# Patient Record
Sex: MALE | Race: WHITE | Employment: UNEMPLOYED | URBAN - METROPOLITAN AREA
[De-identification: names, ages, dates, MRNs, and addresses within clinical notes are randomized per-mention and may not be internally consistent; named-entity substitution may affect disease eponyms.]

---

## 2017-09-08 ENCOUNTER — HOSPITAL ENCOUNTER (OUTPATIENT)
Dept: MRI IMAGING | Age: 42
Discharge: HOME | End: 2017-09-08
Attending: PHYSICIAN ASSISTANT | Admitting: PHYSICIAN ASSISTANT

## 2017-09-08 DIAGNOSIS — M25.562 CHRONIC PAIN OF LEFT KNEE: ICD-10-CM

## 2017-09-08 DIAGNOSIS — M25.562 PAIN IN LEFT KNEE: ICD-10-CM

## 2017-09-08 DIAGNOSIS — G89.29 CHRONIC PAIN OF LEFT KNEE: ICD-10-CM

## 2018-03-30 PROBLEM — K57.32 DIVERTICULITIS OF COLON: Status: ACTIVE | Noted: 2018-03-30

## 2018-04-09 ENCOUNTER — OFFICE VISIT (OUTPATIENT)
Dept: SURGERY | Age: 43
End: 2018-04-09

## 2018-04-09 VITALS
BODY MASS INDEX: 27.11 KG/M2 | SYSTOLIC BLOOD PRESSURE: 110 MMHG | DIASTOLIC BLOOD PRESSURE: 78 MMHG | HEIGHT: 69 IN | WEIGHT: 183 LBS

## 2018-04-09 DIAGNOSIS — K57.20 DIVERTICULITIS OF LARGE INTESTINE WITH PERFORATION WITHOUT BLEEDING: Primary | ICD-10-CM

## 2018-04-09 PROCEDURE — 1111F DSCHRG MED/CURRENT MED MERGE: CPT | Performed by: SURGERY

## 2018-04-09 PROCEDURE — 99213 OFFICE O/P EST LOW 20 MIN: CPT | Performed by: SURGERY

## 2018-04-09 PROCEDURE — 1036F TOBACCO NON-USER: CPT | Performed by: SURGERY

## 2018-04-09 PROCEDURE — G8419 CALC BMI OUT NRM PARAM NOF/U: HCPCS | Performed by: SURGERY

## 2018-04-09 PROCEDURE — G8427 DOCREV CUR MEDS BY ELIG CLIN: HCPCS | Performed by: SURGERY

## 2018-04-09 RX ORDER — BUTALBITAL, ACETAMINOPHEN AND CAFFEINE 50; 325; 40 MG/1; MG/1; MG/1
TABLET ORAL
COMMUNITY
Start: 2018-03-09 | End: 2019-03-29

## 2018-04-13 ENCOUNTER — TELEPHONE (OUTPATIENT)
Dept: SURGERY | Age: 43
End: 2018-04-13

## 2018-04-25 DIAGNOSIS — K57.20 PERFORATED DIVERTICULUM OF LARGE INTESTINE: Primary | ICD-10-CM

## 2018-05-04 LAB
BUN BLDV-MCNC: 15 MG/DL (ref 5–19)
CREAT SERPL-MCNC: 1.1 MG/DL (ref 0.6–1.3)
GFR CALCULATED: 73

## 2018-05-07 ENCOUNTER — OFFICE VISIT (OUTPATIENT)
Dept: SURGERY | Age: 43
End: 2018-05-07

## 2018-05-07 ENCOUNTER — TELEPHONE (OUTPATIENT)
Dept: SURGERY | Age: 43
End: 2018-05-07

## 2018-05-07 VITALS
BODY MASS INDEX: 27.4 KG/M2 | HEIGHT: 69 IN | SYSTOLIC BLOOD PRESSURE: 118 MMHG | DIASTOLIC BLOOD PRESSURE: 70 MMHG | WEIGHT: 185 LBS

## 2018-05-07 DIAGNOSIS — K57.20 PERFORATED DIVERTICULUM OF LARGE INTESTINE: Primary | ICD-10-CM

## 2018-05-07 PROCEDURE — 99213 OFFICE O/P EST LOW 20 MIN: CPT | Performed by: SURGERY

## 2018-05-07 PROCEDURE — G8419 CALC BMI OUT NRM PARAM NOF/U: HCPCS | Performed by: SURGERY

## 2018-05-07 PROCEDURE — 1036F TOBACCO NON-USER: CPT | Performed by: SURGERY

## 2018-05-07 PROCEDURE — G8427 DOCREV CUR MEDS BY ELIG CLIN: HCPCS | Performed by: SURGERY

## 2018-05-08 ENCOUNTER — TELEPHONE (OUTPATIENT)
Dept: GASTROENTEROLOGY | Age: 43
End: 2018-05-08

## 2018-05-08 DIAGNOSIS — K57.20 DIVERTICULITIS OF LARGE INTESTINE WITH PERFORATION, UNSPECIFIED BLEEDING STATUS: Primary | ICD-10-CM

## 2018-05-09 RX ORDER — POLYETHYLENE GLYCOL 3350 17 G/17G
255 POWDER, FOR SOLUTION ORAL ONCE
Qty: 255 G | Refills: 0 | Status: SHIPPED | OUTPATIENT
Start: 2018-05-09 | End: 2018-05-09

## 2018-05-18 ENCOUNTER — TELEPHONE (OUTPATIENT)
Dept: GASTROENTEROLOGY | Age: 43
End: 2018-05-18

## 2018-05-21 ENCOUNTER — HOSPITAL ENCOUNTER (OUTPATIENT)
Dept: OTHER | Age: 43
Discharge: HOME | End: 2018-05-21
Attending: INTERNAL MEDICINE | Admitting: INTERNAL MEDICINE

## 2018-05-21 ENCOUNTER — TELEPHONE (OUTPATIENT)
Dept: GASTROENTEROLOGY | Age: 43
End: 2018-05-21

## 2018-05-21 VITALS
RESPIRATION RATE: 12 BRPM | WEIGHT: 180 LBS | SYSTOLIC BLOOD PRESSURE: 115 MMHG | OXYGEN SATURATION: 96 % | HEIGHT: 69 IN | DIASTOLIC BLOOD PRESSURE: 81 MMHG | BODY MASS INDEX: 26.66 KG/M2 | TEMPERATURE: 97.1 F | HEART RATE: 79 BPM

## 2018-05-21 DIAGNOSIS — K57.30 SIGMOID DIVERTICULOSIS: ICD-10-CM

## 2018-05-21 PROCEDURE — 45378 DIAGNOSTIC COLONOSCOPY: CPT | Performed by: INTERNAL MEDICINE

## 2018-05-21 RX ORDER — SODIUM CHLORIDE 9 MG/ML
INJECTION, SOLUTION INTRAVENOUS CONTINUOUS
Status: DISCONTINUED | OUTPATIENT
Start: 2018-05-21 | End: 2018-05-22 | Stop reason: HOSPADM

## 2018-05-21 RX ADMIN — SODIUM CHLORIDE: 9 INJECTION, SOLUTION INTRAVENOUS at 08:02

## 2018-05-21 ASSESSMENT — PAIN - FUNCTIONAL ASSESSMENT: PAIN_FUNCTIONAL_ASSESSMENT: 0-10

## 2018-05-22 PROBLEM — K57.20 DIVERTICULITIS OF LARGE INTESTINE WITH PERFORATION WITHOUT BLEEDING: Status: ACTIVE | Noted: 2018-03-30

## 2018-05-26 PROBLEM — K57.20 DIVERTICULITIS OF LARGE INTESTINE WITH PERFORATION WITHOUT BLEEDING: Status: RESOLVED | Noted: 2018-03-30 | Resolved: 2018-05-26

## 2018-05-30 ENCOUNTER — OFFICE VISIT (OUTPATIENT)
Dept: SURGERY | Age: 43
End: 2018-05-30

## 2018-05-30 VITALS
BODY MASS INDEX: 25.18 KG/M2 | WEIGHT: 170 LBS | HEIGHT: 69 IN | SYSTOLIC BLOOD PRESSURE: 100 MMHG | DIASTOLIC BLOOD PRESSURE: 70 MMHG

## 2018-05-30 DIAGNOSIS — Z09 SURGERY FOLLOW-UP EXAMINATION: Primary | ICD-10-CM

## 2018-05-30 PROCEDURE — 99024 POSTOP FOLLOW-UP VISIT: CPT | Performed by: SURGERY

## 2018-06-06 ENCOUNTER — OFFICE VISIT (OUTPATIENT)
Dept: SURGERY | Age: 43
End: 2018-06-06

## 2018-06-06 VITALS
HEIGHT: 69 IN | DIASTOLIC BLOOD PRESSURE: 72 MMHG | WEIGHT: 174 LBS | SYSTOLIC BLOOD PRESSURE: 110 MMHG | BODY MASS INDEX: 25.77 KG/M2

## 2018-06-06 DIAGNOSIS — Z09 SURGERY FOLLOW-UP EXAMINATION: Primary | ICD-10-CM

## 2018-06-06 PROCEDURE — 99024 POSTOP FOLLOW-UP VISIT: CPT | Performed by: SURGERY

## 2018-06-20 ENCOUNTER — OFFICE VISIT (OUTPATIENT)
Dept: SURGERY | Age: 43
End: 2018-06-20

## 2018-06-20 VITALS
WEIGHT: 177 LBS | DIASTOLIC BLOOD PRESSURE: 82 MMHG | BODY MASS INDEX: 26.22 KG/M2 | SYSTOLIC BLOOD PRESSURE: 126 MMHG | HEIGHT: 69 IN

## 2018-06-20 DIAGNOSIS — Z09 SURGERY FOLLOW-UP EXAMINATION: Primary | ICD-10-CM

## 2018-06-20 PROCEDURE — 99024 POSTOP FOLLOW-UP VISIT: CPT | Performed by: SURGERY

## 2018-09-17 ENCOUNTER — TELEPHONE (OUTPATIENT)
Dept: SURGERY | Age: 43
End: 2018-09-17

## 2018-09-17 NOTE — TELEPHONE ENCOUNTER
Spoke to patient. He denies fever,severe abd pain or vomiting. BM normal other than some diarrhea today. He will watch his symptoms and go to ER if thingd become severe.   Otherwise  He will call me on Wed and let me know how he is feeling and we can see him if not improved

## 2018-09-17 NOTE — TELEPHONE ENCOUNTER
Pt sts he is in extreme pain on his left side. Doesn't know if it is related to surgery. His pain level is a 5-6. He would like to know what to do.

## 2019-03-29 ENCOUNTER — HOSPITAL ENCOUNTER (EMERGENCY)
Age: 44
Discharge: HOME | End: 2019-03-29
Payer: MEDICAID

## 2019-03-29 VITALS
HEART RATE: 87 BPM | TEMPERATURE: 97.3 F | SYSTOLIC BLOOD PRESSURE: 130 MMHG | HEIGHT: 69 IN | OXYGEN SATURATION: 98 % | BODY MASS INDEX: 29.62 KG/M2 | RESPIRATION RATE: 15 BRPM | DIASTOLIC BLOOD PRESSURE: 88 MMHG | WEIGHT: 200 LBS

## 2019-03-29 DIAGNOSIS — J01.10 ACUTE NON-RECURRENT FRONTAL SINUSITIS: Primary | ICD-10-CM

## 2019-03-29 LAB
RAPID INFLUENZA  B AGN: NEGATIVE
RAPID INFLUENZA A AGN: NEGATIVE

## 2019-03-29 PROCEDURE — 6370000000 HC RX 637 (ALT 250 FOR IP): Performed by: NURSE PRACTITIONER

## 2019-03-29 PROCEDURE — 99283 EMERGENCY DEPT VISIT LOW MDM: CPT

## 2019-03-29 PROCEDURE — 87804 INFLUENZA ASSAY W/OPTIC: CPT

## 2019-03-29 RX ORDER — AMOXICILLIN AND CLAVULANATE POTASSIUM 875; 125 MG/1; MG/1
1 TABLET, FILM COATED ORAL ONCE
Status: COMPLETED | OUTPATIENT
Start: 2019-03-29 | End: 2019-03-29

## 2019-03-29 RX ORDER — NAPROXEN 250 MG/1
250 TABLET ORAL ONCE
Status: COMPLETED | OUTPATIENT
Start: 2019-03-29 | End: 2019-03-29

## 2019-03-29 RX ORDER — AMOXICILLIN AND CLAVULANATE POTASSIUM 875; 125 MG/1; MG/1
1 TABLET, FILM COATED ORAL 2 TIMES DAILY
Qty: 20 TABLET | Refills: 0 | Status: SHIPPED | OUTPATIENT
Start: 2019-03-29 | End: 2019-04-08

## 2019-03-29 RX ADMIN — AMOXICILLIN AND CLAVULANATE POTASSIUM 1 TABLET: 875; 125 TABLET, FILM COATED ORAL at 13:57

## 2019-03-29 RX ADMIN — NAPROXEN 250 MG: 250 TABLET ORAL at 13:57

## 2019-03-29 ASSESSMENT — PAIN SCALES - GENERAL: PAINLEVEL_OUTOF10: 6

## 2019-03-29 ASSESSMENT — ENCOUNTER SYMPTOMS
SHORTNESS OF BREATH: 0
ABDOMINAL PAIN: 0
SINUS PRESSURE: 1

## 2019-03-29 ASSESSMENT — PAIN DESCRIPTION - PAIN TYPE: TYPE: ACUTE PAIN

## 2019-03-29 ASSESSMENT — PAIN DESCRIPTION - LOCATION: LOCATION: HEAD

## 2019-03-29 NOTE — ED PROVIDER NOTES
Evaluated by [id number] [facility] Provider          [facility] ED  eMERGENCY dEPARTMENT eNCOUnter        Pt Name: Sagar Jones  MRN: [id number]  [place] 1975  Dateof evaluation: 3/29/2019  Provider: YFN Morales - CNP  PCP: Karlos Li PA-C  ED Attending: No att. providers found    [address] [place] [address]       Chief Complaint   Patient presents with    Influenza     Pt reports generalized body aching, headache, sneezing, nasal drainage since yesterday. HISTORY OF PRESENTILLNESS   (Location/Symptom, Timing/Onset, Context/Setting, Quality, Duration, Modifying Factors, Severity)  Note limiting factors. Sagar Jones is a 39 y.o. male for congestion. Onset was yesterday. Duration has been since the onset. Context includes patient reports that he has had facial pressure and congestion since yesterday. He also reports that he is recently been exposed to in the members who have been sick as well. Alleviating factors include nothing. Aggravating factors include nothing. Pain is 6/10. Nothing has been used for pain today. Nursing Notes were all reviewed and agreed with or any disagreements were addressed  in the HPI. REVIEW OF SYSTEMS    (2-9 systems for level 4, 10 or more for level 5)     Review of Systems   Constitutional: Positive for chills. Negative for fever. HENT: Positive for congestion and sinus pressure. Respiratory: Negative for shortness of breath. Cardiovascular: Negative for chest pain. Gastrointestinal: Negative for abdominal pain. Genitourinary: Negative for difficulty urinating. Neurological: Positive for headaches. All other systems reviewed and are negative. Positives and Pertinent negatives as per HPI. Except as noted above in the ROS, all other systems were reviewed and negative.        PAST MEDICAL HISTORY     Past Medical History:   Diagnosis Date    Arthritis     left knee    Diverticulitis     GERD (gastroesophageal tablet 250 mg (250 mg Oral Given 3/29/19 1349)       Patient was seen and evaluated by myself. Patient here for complaints of congestion. Patient states she's not had a fever but he has had chills. Patient reports increased facial pressure. On exam is awake and alert hemodynamically stable nontoxic in appearance. Patient has been exposed to sick contacts. His rapid influenza was negative. Patient be treated for sinusitis. He is given a dose of Augmentin and Naprosyn the ED. He will be given a prescription for Augmentin. He was encouraged to follow up with his primary care doctor in the next few days or return to the ED for worsening symptoms. Discharged home with all questions answered. The patient tolerated their visit well. I have evaluated thispatient. My supervising physician was available for consultation. The patient and / or the family were informed of the results of any tests, a time was given to answer questions, a plan was proposed and they agreed Lola Edouard. FINAL IMPRESSION      1.  Acute non-recurrent frontal sinusitis          DISPOSITION/PLAN   DISPOSITION Decision To Discharge 03/29/2019 01:49:40 PM      PATIENT REFERRED TO:  Guillaume Askew PA-C  [address]   [phone]    Schedule an appointment as soon as possible for a visit in 2 days  for re-evaluation    [facility]  [address] [facility]  [phone]    If symptoms worsen      DISCHARGE MEDICATIONS:  Discharge Medication List as of 3/29/2019  1:57 PM      START taking these medications    Details   amoxicillin-clavulanate (AUGMENTIN) 875-125 MG per tablet Take 1 tablet by mouth 2 times daily for 10 days, Disp-20 tablet, R-0Print             DISCONTINUED MEDICATIONS:  Discharge Medication List as of 3/29/2019  1:57 PM      STOP taking these medications       butalbital-acetaminophen-caffeine (FIORICET, ESGIC) -40 MG per tablet Comments:   Reason for Stopping: ibuprofen (ADVIL;MOTRIN) 400 MG tablet Comments:   Reason for Stopping:                      (Please note that portions of this note were completed with a voice recognition program.  Efforts were made to edit the dictations but occasionally words are mis-transcribed.)    YFN Bajwa CNP (electronically signed)         YFN Bajwa CNP  03/29/19 7720

## 2019-06-18 ENCOUNTER — HOSPITAL ENCOUNTER (EMERGENCY)
Age: 44
Discharge: HOME | End: 2019-06-18
Payer: MEDICAID

## 2019-06-18 ENCOUNTER — APPOINTMENT (OUTPATIENT)
Dept: CT IMAGING | Age: 44
End: 2019-06-18
Payer: MEDICAID

## 2019-06-18 VITALS
HEART RATE: 58 BPM | SYSTOLIC BLOOD PRESSURE: 130 MMHG | DIASTOLIC BLOOD PRESSURE: 92 MMHG | OXYGEN SATURATION: 98 % | HEIGHT: 69 IN | BODY MASS INDEX: 29.92 KG/M2 | TEMPERATURE: 97.8 F | RESPIRATION RATE: 16 BRPM | WEIGHT: 202 LBS

## 2019-06-18 DIAGNOSIS — R10.9 LEFT FLANK PAIN: ICD-10-CM

## 2019-06-18 DIAGNOSIS — R11.0 NAUSEA WITHOUT VOMITING: ICD-10-CM

## 2019-06-18 DIAGNOSIS — N20.1 LEFT URETERAL STONE: Primary | ICD-10-CM

## 2019-06-18 LAB
A/G RATIO: 1.3 (ref 1.1–2.2)
ALBUMIN SERPL-MCNC: 4 G/DL (ref 3.4–5)
ALP BLD-CCNC: 66 U/L (ref 40–129)
ALT SERPL-CCNC: 28 U/L (ref 10–40)
ANION GAP SERPL CALCULATED.3IONS-SCNC: 10 MMOL/L (ref 3–16)
AST SERPL-CCNC: 15 U/L (ref 15–37)
BASOPHILS ABSOLUTE: 0.1 K/UL (ref 0–0.2)
BASOPHILS RELATIVE PERCENT: 0.5 %
BILIRUB SERPL-MCNC: 0.5 MG/DL (ref 0–1)
BILIRUBIN URINE: NEGATIVE
BLOOD, URINE: ABNORMAL
BUN BLDV-MCNC: 19 MG/DL (ref 7–20)
CALCIUM SERPL-MCNC: 9 MG/DL (ref 8.3–10.6)
CHLORIDE BLD-SCNC: 110 MMOL/L (ref 99–110)
CLARITY: CLEAR
CO2: 24 MMOL/L (ref 21–32)
COLOR: YELLOW
COMMENT UA: ABNORMAL
CREAT SERPL-MCNC: 1.1 MG/DL (ref 0.9–1.3)
EOSINOPHILS ABSOLUTE: 0 K/UL (ref 0–0.6)
EOSINOPHILS RELATIVE PERCENT: 0.1 %
GFR AFRICAN AMERICAN: >60
GFR NON-AFRICAN AMERICAN: >60
GLOBULIN: 3 G/DL
GLUCOSE BLD-MCNC: 112 MG/DL (ref 70–99)
GLUCOSE URINE: NEGATIVE MG/DL
HCT VFR BLD CALC: 43.4 % (ref 40.5–52.5)
HEMOGLOBIN: 15 G/DL (ref 13.5–17.5)
KETONES, URINE: NEGATIVE MG/DL
LEUKOCYTE ESTERASE, URINE: NEGATIVE
LIPASE: 20 U/L (ref 13–60)
LYMPHOCYTES ABSOLUTE: 2.9 K/UL (ref 1–5.1)
LYMPHOCYTES RELATIVE PERCENT: 19.2 %
MCH RBC QN AUTO: 32.7 PG (ref 26–34)
MCHC RBC AUTO-ENTMCNC: 34.5 G/DL (ref 31–36)
MCV RBC AUTO: 94.6 FL (ref 80–100)
MICROSCOPIC EXAMINATION: YES
MONOCYTES ABSOLUTE: 1.3 K/UL (ref 0–1.3)
MONOCYTES RELATIVE PERCENT: 8.9 %
NEUTROPHILS ABSOLUTE: 10.7 K/UL (ref 1.7–7.7)
NEUTROPHILS RELATIVE PERCENT: 71.3 %
NITRITE, URINE: NEGATIVE
PDW BLD-RTO: 13.8 % (ref 12.4–15.4)
PH UA: 7 (ref 5–8)
PLATELET # BLD: 228 K/UL (ref 135–450)
PMV BLD AUTO: 9.4 FL (ref 5–10.5)
POTASSIUM SERPL-SCNC: 4.3 MMOL/L (ref 3.5–5.1)
PROTEIN UA: ABNORMAL MG/DL
RBC # BLD: 4.59 M/UL (ref 4.2–5.9)
RBC UA: >100 /HPF (ref 0–2)
SODIUM BLD-SCNC: 144 MMOL/L (ref 136–145)
SPECIFIC GRAVITY UA: 1.01 (ref 1–1.03)
TOTAL PROTEIN: 7 G/DL (ref 6.4–8.2)
URINE TYPE: ABNORMAL
UROBILINOGEN, URINE: 1 E.U./DL
WBC # BLD: 15 K/UL (ref 4–11)
WBC UA: ABNORMAL /HPF (ref 0–5)

## 2019-06-18 PROCEDURE — 6360000004 HC RX CONTRAST MEDICATION: Performed by: NURSE PRACTITIONER

## 2019-06-18 PROCEDURE — 85025 COMPLETE CBC W/AUTO DIFF WBC: CPT

## 2019-06-18 PROCEDURE — 80053 COMPREHEN METABOLIC PANEL: CPT

## 2019-06-18 PROCEDURE — 96374 THER/PROPH/DIAG INJ IV PUSH: CPT

## 2019-06-18 PROCEDURE — 83690 ASSAY OF LIPASE: CPT

## 2019-06-18 PROCEDURE — 96375 TX/PRO/DX INJ NEW DRUG ADDON: CPT

## 2019-06-18 PROCEDURE — 2580000003 HC RX 258: Performed by: NURSE PRACTITIONER

## 2019-06-18 PROCEDURE — 74177 CT ABD & PELVIS W/CONTRAST: CPT

## 2019-06-18 PROCEDURE — 81001 URINALYSIS AUTO W/SCOPE: CPT

## 2019-06-18 PROCEDURE — 6360000002 HC RX W HCPCS: Performed by: NURSE PRACTITIONER

## 2019-06-18 PROCEDURE — 6370000000 HC RX 637 (ALT 250 FOR IP): Performed by: NURSE PRACTITIONER

## 2019-06-18 PROCEDURE — 99284 EMERGENCY DEPT VISIT MOD MDM: CPT

## 2019-06-18 RX ORDER — TAMSULOSIN HYDROCHLORIDE 0.4 MG/1
0.4 CAPSULE ORAL ONCE
Status: COMPLETED | OUTPATIENT
Start: 2019-06-18 | End: 2019-06-18

## 2019-06-18 RX ORDER — ONDANSETRON 4 MG/1
8 TABLET, ORALLY DISINTEGRATING ORAL ONCE
Status: COMPLETED | OUTPATIENT
Start: 2019-06-18 | End: 2019-06-18

## 2019-06-18 RX ORDER — TAMSULOSIN HYDROCHLORIDE 0.4 MG/1
0.4 CAPSULE ORAL DAILY
Qty: 5 CAPSULE | Refills: 0 | Status: SHIPPED | OUTPATIENT
Start: 2019-06-18 | End: 2019-12-11

## 2019-06-18 RX ORDER — OXYCODONE HYDROCHLORIDE AND ACETAMINOPHEN 5; 325 MG/1; MG/1
1-2 TABLET ORAL EVERY 6 HOURS PRN
Qty: 20 TABLET | Refills: 0 | Status: SHIPPED | OUTPATIENT
Start: 2019-06-18 | End: 2019-06-21

## 2019-06-18 RX ORDER — MORPHINE SULFATE 4 MG/ML
4 INJECTION, SOLUTION INTRAMUSCULAR; INTRAVENOUS ONCE
Status: COMPLETED | OUTPATIENT
Start: 2019-06-18 | End: 2019-06-18

## 2019-06-18 RX ORDER — OXYCODONE HYDROCHLORIDE AND ACETAMINOPHEN 5; 325 MG/1; MG/1
2 TABLET ORAL ONCE
Status: COMPLETED | OUTPATIENT
Start: 2019-06-18 | End: 2019-06-18

## 2019-06-18 RX ORDER — BUTALBITAL, ACETAMINOPHEN AND CAFFEINE 50; 325; 40 MG/1; MG/1; MG/1
TABLET ORAL
COMMUNITY
Start: 2019-06-13

## 2019-06-18 RX ORDER — ONDANSETRON 2 MG/ML
4 INJECTION INTRAMUSCULAR; INTRAVENOUS ONCE
Status: COMPLETED | OUTPATIENT
Start: 2019-06-18 | End: 2019-06-18

## 2019-06-18 RX ORDER — TOPIRAMATE 50 MG/1
TABLET, FILM COATED ORAL
Refills: 2 | COMMUNITY
Start: 2019-06-11

## 2019-06-18 RX ORDER — 0.9 % SODIUM CHLORIDE 0.9 %
1000 INTRAVENOUS SOLUTION INTRAVENOUS ONCE
Status: COMPLETED | OUTPATIENT
Start: 2019-06-18 | End: 2019-06-18

## 2019-06-18 RX ORDER — ONDANSETRON 4 MG/1
4 TABLET, ORALLY DISINTEGRATING ORAL EVERY 8 HOURS PRN
Qty: 20 TABLET | Refills: 0 | Status: SHIPPED | OUTPATIENT
Start: 2019-06-18 | End: 2019-12-11

## 2019-06-18 RX ADMIN — OXYCODONE HYDROCHLORIDE AND ACETAMINOPHEN 2 TABLET: 5; 325 TABLET ORAL at 19:42

## 2019-06-18 RX ADMIN — TAMSULOSIN HYDROCHLORIDE 0.4 MG: 0.4 CAPSULE ORAL at 19:42

## 2019-06-18 RX ADMIN — ONDANSETRON 8 MG: 4 TABLET, ORALLY DISINTEGRATING ORAL at 19:42

## 2019-06-18 RX ADMIN — ONDANSETRON 4 MG: 2 INJECTION INTRAMUSCULAR; INTRAVENOUS at 18:03

## 2019-06-18 RX ADMIN — SODIUM CHLORIDE 1000 ML: 9 INJECTION, SOLUTION INTRAVENOUS at 18:03

## 2019-06-18 RX ADMIN — IOPAMIDOL 75 ML: 755 INJECTION, SOLUTION INTRAVENOUS at 18:47

## 2019-06-18 RX ADMIN — MORPHINE SULFATE 4 MG: 4 INJECTION INTRAVENOUS at 18:03

## 2019-06-18 ASSESSMENT — PAIN DESCRIPTION - ORIENTATION
ORIENTATION: LEFT
ORIENTATION: LEFT

## 2019-06-18 ASSESSMENT — ENCOUNTER SYMPTOMS
ABDOMINAL PAIN: 1
COLOR CHANGE: 0
NAUSEA: 1
BACK PAIN: 0
SHORTNESS OF BREATH: 0
WHEEZING: 0
COUGH: 0
VOMITING: 0
DIARRHEA: 0

## 2019-06-18 ASSESSMENT — PAIN SCALES - GENERAL
PAINLEVEL_OUTOF10: 5
PAINLEVEL_OUTOF10: 8
PAINLEVEL_OUTOF10: 4
PAINLEVEL_OUTOF10: 2
PAINLEVEL_OUTOF10: 7

## 2019-06-18 ASSESSMENT — PAIN DESCRIPTION - PAIN TYPE
TYPE: ACUTE PAIN
TYPE: ACUTE PAIN

## 2019-06-18 ASSESSMENT — PAIN DESCRIPTION - LOCATION
LOCATION: FLANK
LOCATION: FLANK

## 2019-06-18 NOTE — LETTER
[facility]  ED  [address] [facility] [address]  Phone: [phone]  Fax: [phone]               June 18, 2019    Patient: Jacinda Harper   YOB: 1975   Date of Visit: 6/18/2019       To Whom It May Concern:    Fern Lopez was seen and treated in our emergency department on 6/18/2019. He may return to work on 6/20/2019. Sincerely,       Main Dubois RN.         Signature:__________________________________

## 2019-06-18 NOTE — ED PROVIDER NOTES
[facility]  EMERGENCY DEPARTMENT ENCOUNTER      Pt Name: Marlys Schwab  LTU:[id number]  [place] 1975  Date of evaluation: 6/18/2019  Provider: YFN Kinney CNP      Chief Complaint:    Chief Complaint   Patient presents with    Flank Pain     left side flank pain since 0200, hematuria        Nursing Notes, Past Medical Hx, Past Surgical Hx, Social Hx, Allergies, and Family Hx were all reviewed and agreed with or any disagreements were addressed in the HPI.    HPI:  (Location, Duration, Timing, Severity,Quality, Assoc Sx, Context, Modifying factors)  This is a  39 y.o. male who presents today with left flank pain that wraps around to his abdomen, he complains the pain started around 230am, states that his pain feels similar to his diverticulitis however he had dark tea colored urine. He went to his PCP office at 930 this morning and they were going to order an outpatient ultrasound however because of insurance he needed preauthorization. He started having worsening pain as a day goes on, states the left flank pain is not wrapping it on the abdomen and having worsening discomfort associated with nausea but no vomiting or diarrhea. He rates the pain an 8 out of 10. He denies any fever or chills. No cough, congestion, chest pain or shortness of breath. He denies taking any medicine for the pain. No additional complaints, no additional aggravating relieving factors. The patient presents awake, alert and in no acute distress or toxic appearance.     PastMedical/Surgical History:      Diagnosis Date    Arthritis     left knee    Diverticulitis     GERD (gastroesophageal reflux disease)     Kidney stones     Migraine          Procedure Laterality Date    COLECTOMY  05/22/2018    Sigmoid    COLONOSCOPY  05/21/2018    diverticulosis    KNEE ARTHROSCOPY      WISDOM TOOTH EXTRACTION      WRIST SURGERY Right Psychiatric: He has a normal mood and affect. His behavior is normal.   Nursing note and vitals reviewed. MEDICAL DECISION MAKING    Vitals:    Vitals:    06/18/19 1650 06/18/19 1802 06/18/19 1909   BP: (!) 137/95 121/81 117/83   Pulse: 77 66 56   Resp: 18 16 16   Temp: 97.7 °F (36.5 °C)     TempSrc: Oral     SpO2: 100% 99% 100%   Weight: 202 lb (91.6 kg)     Height: 5' 9\" (1.753 m)         LABS:  Labs Reviewed   COMPREHENSIVE METABOLIC PANEL - Abnormal; Notable for the following components:       Result Value    Glucose 112 (*)     All other components within normal limits    Narrative:     Performed at:  [place] [address] [employer]   Phone [phone]   CBC WITH AUTO DIFFERENTIAL - Abnormal; Notable for the following components:    WBC 15.0 (*)     Neutrophils # 10.7 (*)     All other components within normal limits    Narrative:     Performed at:  [place] [address] [employer]   Phone [phone]   URINALYSIS - Abnormal; Notable for the following components:    Blood, Urine LARGE (*)     Protein, UA TRACE (*)     All other components within normal limits    Narrative:     Performed at:  [place] [address] [employer]   Phone [phone]   MICROSCOPIC URINALYSIS - Abnormal; Notable for the following components:    RBC, UA >100 (*)     All other components within normal limits    Narrative:     Performed at:  [place] [address] [employer]   Phone [phone]   LIPASE    Narrative:     Performed at:  [address], [facility] [employer]   Phone  of labs reviewed and werenegative at this time or not returned at the time of this note.     RADIOLOGY:   Non-plain film images such as CT, Ultrasound and MRI are read by the radiologist. YFN Arevalo CNP have directly visualized the radiologic plain film image(s) with the below findings:        Interpretation per the Radiologist below, if available at the time of thisnote:    CT ABDOMEN PELVIS W IV CONTRAST Additional Contrast? None   Final Result   There are couple of obstructing upper left ureteral stones with only mild   prominence of the proximal ureter. MEDICAL DECISION MAKING / ED COURSE:      PROCEDURES:   Procedures    None    Patient was given:  Medications   0.9 % sodium chloride bolus (0 mLs Intravenous Stopped 6/18/19 1909)   morphine injection 4 mg (4 mg Intravenous Given 6/18/19 1803)   ondansetron (ZOFRAN) injection 4 mg (4 mg Intravenous Given 6/18/19 1803)   iopamidol (ISOVUE-370) 76 % injection 75 mL (75 mLs Intravenous Given 6/18/19 1847)   tamsulosin (FLOMAX) capsule 0.4 mg (0.4 mg Oral Given 6/18/19 1942)   oxyCODONE-acetaminophen (PERCOCET) 5-325 MG per tablet 2 tablet (2 tablets Oral Given 6/18/19 1942)   ondansetron (ZOFRAN-ODT) disintegrating tablet 8 mg (8 mg Oral Given 6/18/19 1942)       Patient presents today with left flank pain that wraps around to his abdomen, he complains the pain started around 230am, states that his pain feels similar to his diverticulitis however he had dark tea colored urine. He went to his PCP office at 930 this morning and they were going to order an outpatient ultrasound however because of insurance he needed preauthorization. After evaluation it is initially patient IV access, blood work, pain medicine, nausea medicine and a CT scan of the abdomen were ordered. CBC shows leukocytosis with a white count of 15,000, no acute anemia. Metabolic panel shows no electrolyte disturbances or renal insufficiency. Liver functions are normal.  Lipase is normal.  Urinalysis shows hematuria but no acute infection.   CT the abdomen shows a couple of obstructing upper left ureteral stones with mild prominence in the proximal ureter, largest stone is 4 mm. Mild left hydroureter. Appendix is normal.  No acute diverticulitis. Upon reevaluation vital signs are stable, he does report much improvement of his pain. I did educate the patient about following up with urology however we agreed that he will be discharged home. Therefore, shared medical decision was made between the patient myself only agreed the patient be discharged home with outpatient follow-up. Patient was discharged home with prescription for Percocet, Flomax and Zofran. Educated to take medicine as prescribed. Educated to follow-up with the urologist in 2 days for reevaluation but to return to the ER immediately for worsening symptoms. The patient tolerated their visit well. I evaluated the patient. The physician was available for consultation as needed. The patient and / or the family were informed of the results of anytests, a time was given to answer questions, a plan was proposed and they agreed with plan. Patient verbalized understanding of discharge instructions and was discharged from the department in stable condition. CLINICAL IMPRESSION:  1. Left ureteral stone    2. Left flank pain    3.  Nausea without vomiting        DISPOSITION Decision To Discharge 06/18/2019 07:40:12 PM      PATIENT REFERRED TO:  Lily Cruz PA-C  [facility] [address]   [phone]    Schedule an appointment as soon as possible for a visit in 2 days  follow up with your family doctor the next 2 days for reevaluation    [facility] [address]  [phone]  Go to   If symptoms worsen    [facility] [address]  [phone]  Schedule an appointment as soon as possible for a visit in 2 days  Urology for an appointment tomorrow morning      DISCHARGE MEDICATIONS:  New Prescriptions    ONDANSETRON (ZOFRAN ODT) 4 MG DISINTEGRATING TABLET    Take 1 tablet by mouth every 8 hours as needed for Nausea    OXYCODONE-ACETAMINOPHEN (PERCOCET) 5-325 MG PER TABLET    Take 1-2 tablets by mouth every 6 hours as needed for Pain for up to 3 days. WARNING:  May cause drowsiness. May impair ability to operate vehicles or machinery. Do not use in combination with alcohol.     TAMSULOSIN (FLOMAX) 0.4 MG CAPSULE    Take 1 capsule by mouth daily for 5 doses       DISCONTINUED MEDICATIONS:  Discontinued Medications    No medications on file              (Please note the MDM and HPI sections of this note were completed with a voice recognition program.  Efforts weremade to edit the dictations but occasionally words are mis-transcribed.)    Electronically signed, YFN Jimenez CNP,         YFN Jimenez CNP  06/18/19 1957

## 2019-12-11 ENCOUNTER — OFFICE VISIT (OUTPATIENT)
Dept: ORTHOPEDIC SURGERY | Age: 44
End: 2019-12-11
Payer: MEDICAID

## 2019-12-11 VITALS — HEIGHT: 69 IN | BODY MASS INDEX: 29.91 KG/M2 | WEIGHT: 201.94 LBS

## 2019-12-11 DIAGNOSIS — M50.30 DDD (DEGENERATIVE DISC DISEASE), CERVICAL: ICD-10-CM

## 2019-12-11 DIAGNOSIS — M54.2 NECK PAIN: Primary | ICD-10-CM

## 2019-12-11 PROCEDURE — G8484 FLU IMMUNIZE NO ADMIN: HCPCS | Performed by: PHYSICAL MEDICINE & REHABILITATION

## 2019-12-11 PROCEDURE — 1036F TOBACCO NON-USER: CPT | Performed by: PHYSICAL MEDICINE & REHABILITATION

## 2019-12-11 PROCEDURE — G8427 DOCREV CUR MEDS BY ELIG CLIN: HCPCS | Performed by: PHYSICAL MEDICINE & REHABILITATION

## 2019-12-11 PROCEDURE — 99204 OFFICE O/P NEW MOD 45 MIN: CPT | Performed by: PHYSICAL MEDICINE & REHABILITATION

## 2019-12-11 PROCEDURE — G8419 CALC BMI OUT NRM PARAM NOF/U: HCPCS | Performed by: PHYSICAL MEDICINE & REHABILITATION

## 2019-12-11 RX ORDER — PREDNISONE 10 MG/1
TABLET ORAL
Qty: 26 TABLET | Refills: 0 | Status: SHIPPED | OUTPATIENT
Start: 2019-12-11

## 2020-09-18 ENCOUNTER — OFFICE VISIT (OUTPATIENT)
Dept: ORTHOPEDIC SURGERY | Age: 45
End: 2020-09-18
Payer: MEDICAID

## 2020-09-18 VITALS — WEIGHT: 200 LBS | BODY MASS INDEX: 29.62 KG/M2 | HEIGHT: 69 IN | HEART RATE: 82 BPM

## 2020-09-18 PROCEDURE — G8427 DOCREV CUR MEDS BY ELIG CLIN: HCPCS | Performed by: ORTHOPAEDIC SURGERY

## 2020-09-18 PROCEDURE — G8419 CALC BMI OUT NRM PARAM NOF/U: HCPCS | Performed by: ORTHOPAEDIC SURGERY

## 2020-09-18 PROCEDURE — 99243 OFF/OP CNSLTJ NEW/EST LOW 30: CPT | Performed by: ORTHOPAEDIC SURGERY

## 2020-09-18 RX ORDER — MAGNESIUM OXIDE 400 MG/1
400 TABLET ORAL DAILY
COMMUNITY

## 2020-09-18 RX ORDER — RIZATRIPTAN BENZOATE 10 MG/1
10 TABLET ORAL
COMMUNITY

## 2020-09-18 RX ORDER — NORTRIPTYLINE HYDROCHLORIDE 10 MG/1
10 CAPSULE ORAL NIGHTLY
COMMUNITY

## 2020-09-18 NOTE — LETTER
[address] [address]  Phone: [phone]  Fax: [phone]    Arnold Villavicencio MD        2020    Vandana Wilkins  [facility] [address]  [address] [facility] [address]   1975  DOS   SHOULDER VISIT      HISTORY OF PRESENT ILLNESS    Vandana Wilkins is a 38 y.o. male who presents for consultation request of Mari Maier PA-C, for left shoulder pain is had for the last several months. It started in April when he is reaching across body to grab something out of the glove box and has had pain in the left shoulder since has become progressively severe worry grades it and least 8/10 in its rapid becoming worse over last 2 months. Put a strain in his shoulder seems to hurt and he was actually put into a sling and given Vicodin and steroids because of the severe nature of his pain. That helped to a slight degree. He now basically has uses left arm and holds it down by his side. ROS    Well-documented patient history form dated 2020  All other ROS negative except for above.     Past Surgical history    Past Surgical History:   Procedure Laterality Date    COLECTOMY  2018    Sigmoid    COLONOSCOPY  2018    diverticulosis    KNEE ARTHROSCOPY      WISDOM TOOTH EXTRACTION      WRIST SURGERY Right        PAST MEDICAL    Past Medical History:   Diagnosis Date    Arthritis     left knee    Diverticulitis     GERD (gastroesophageal reflux disease)     Kidney stones     Migraine        Allergies    Allergies   Allergen Reactions    Ultram [Tramadol Hcl] Itching       Meds    Current Outpatient Medications   Medication Sig Dispense Refill    magnesium oxide (MAG-OX) 400 MG tablet Take 400 mg by mouth daily      nortriptyline (PAMELOR) 10 MG capsule Take 10 mg by mouth nightly      rizatriptan (MAXALT) 10 MG tablet Take 10 mg by mouth once as needed for Migraine May repeat in 2 hours if needed  topiramate (TOPAMAX) 50 MG tablet TAKE 1 TABLET BY MOUTH TWICE DAILY  2    predniSONE (DELTASONE) 10 MG tablet Take 3 tabs bid x2days, then 2 tabs bid x2days, then 1 tab bid x2days, then 1 tab qd x2days 26 tablet 0    butalbital-acetaminophen-caffeine (FIORICET, ESGIC) -40 MG per tablet        No current facility-administered medications for this visit. Social    Social History     Socioeconomic History    Marital status:      Spouse name: Not on file    Number of children: Not on file    Years of education: Not on file    Highest education level: Not on file   Occupational History    Not on file   Social Needs    Financial resource strain: Not on file    Food insecurity     Worry: Not on file     Inability: Not on file    Transportation needs     Medical: Not on file     Non-medical: Not on file   Tobacco Use    Smoking status: Never Smoker    Smokeless tobacco: Never Used   Substance and Sexual Activity    Alcohol use: No    Drug use: No    Sexual activity: Yes     Partners: Female   Lifestyle    Physical activity     Days per week: Not on file     Minutes per session: Not on file    Stress: Not on file   Relationships    Social connections     Talks on phone: Not on file     Gets together: Not on file     Attends [employer] service: Not on file     Active member of club or organization: Not on file     Attends meetings of clubs or organizations: Not on file     Relationship status: Not on file    Intimate partner violence     Fear of current or ex partner: Not on file     Emotionally abused: Not on file     Physically abused: Not on file     Forced sexual activity: Not on file   Other Topics Concern    Not on file   Social History Narrative    Not on file       Family HISTORY    No family history on file.     PHYSICAL EXAM    Vital Signs:  Pulse 82   Ht 5' 9\" (1.753 m)   Wt 200 lb (90.7 kg)   BMI 29.53 kg/m² General Appearance:  Normal body habitus. Alert and oriented to person, place, and time. Affect:  Normal.   Skin:  Intact. Sensation:  Intact. Strength:  Intact. Reflexes:  Intact. Pulses:  Intact. Shoulder Exam  He has a full range of motion of the neck. Cervical range of motion negative Spurling's maneuver. His neurocirculatory lymphatic exam is otherwise normal symmetric in both upper extremities. Examination of the shoulder reveals: He has very painful range of motion with limitations of extension and internal rotation mostly. Any quick motions seem to hurt substantially. He has some crepitus in the shoulder. Most the pain is anterior and superior. He has no tenderness over the proximal biceps. He has a full active range of motion of the elbow, wrist and hand. Range of motion  (in degrees)   Right Left   ABDUCTION       EXT. ROTATION       INT. ROTATION       FORWARD FLEX    STRENGTH         Provocative Test Positive Negative Not indicated   Spurling Sign [] [x] []   Cross Arm Adduction Test [x] [] []   Apprehension Sign [] [] [x]   Neer Sign [x] [] []   Douglas Impingement Sign [x] [] []   Yergason test [] [] []   OGeovannys test [] [] []     Other Provocative tests:     IMAGING STUDIES    X-rays 3 views the left shoulder they brought with him are unremarkable for acute or chronic pathology    IMPRESSION    Left rotator cuff syndrome possible tear and possible labral tear    PLAN    1. Conservative care options including medicines and therapy were discussed. 2.  The indications for therapeutic injections were discussed. 3.  The indications for additional imaging studies were discussed.    4.  After considering the various options discussed, the patient elected to pursue a course that includes requesting an MRI left shoulder because of the severe nature of his pain and the exam despite him trying exercise that he was prescribed by his physician and medication that he was taken including steroids and narcotic pain medication. To avoid needing this long-term and to get him out of his sling, and recommend the MRI and follow-up with 1 of our shoulder surgeons after testing for disposition. Abhijeet Bauer.  MD Arnold Colorado MD

## 2020-10-05 ENCOUNTER — TELEPHONE (OUTPATIENT)
Dept: ORTHOPEDIC SURGERY | Age: 45
End: 2020-10-05

## 2020-10-05 NOTE — TELEPHONE ENCOUNTER
Called & talked with the patient about getting an MRI, doctor Faby Lozada ordered one to check for a tear, patient was not able to get MRI done due to insurance denying the MRI stating he needed to do more conservative treatment first before getting MRI.  [facility]

## 2020-10-06 ENCOUNTER — OFFICE VISIT (OUTPATIENT)
Dept: ORTHOPEDIC SURGERY | Age: 45
End: 2020-10-06
Payer: MEDICAID

## 2020-10-06 VITALS — HEIGHT: 69 IN | WEIGHT: 200 LBS | BODY MASS INDEX: 29.62 KG/M2

## 2020-10-06 PROCEDURE — G8427 DOCREV CUR MEDS BY ELIG CLIN: HCPCS | Performed by: ORTHOPAEDIC SURGERY

## 2020-10-06 PROCEDURE — 1036F TOBACCO NON-USER: CPT | Performed by: ORTHOPAEDIC SURGERY

## 2020-10-06 PROCEDURE — G8419 CALC BMI OUT NRM PARAM NOF/U: HCPCS | Performed by: ORTHOPAEDIC SURGERY

## 2020-10-06 PROCEDURE — 99213 OFFICE O/P EST LOW 20 MIN: CPT | Performed by: ORTHOPAEDIC SURGERY

## 2020-10-06 PROCEDURE — G8484 FLU IMMUNIZE NO ADMIN: HCPCS | Performed by: ORTHOPAEDIC SURGERY

## 2020-10-06 RX ORDER — METHYLPREDNISOLONE 4 MG/1
TABLET ORAL
Qty: 1 KIT | Refills: 0 | Status: SHIPPED | OUTPATIENT
Start: 2020-10-06

## 2020-10-06 NOTE — PROGRESS NOTES
I am evaluating this patient as a consult at the request of Filiberto Kennedy MD  [facility], [address] [facility] [address]     Chief Complaint:  New Patient (L shoulder pain )      History of Present Illness:  Reed Clifford is a  38 y.o. right hand dominant male here regarding 3 months of left shoulder pain, patient states he was reaching across his body in his car to  an object when he felt a sharp pain and pop in the left shoulder. Since then he has had difficulty with overhead activity, 5 out of 10 dull achy pain sharp pain first thing in the morning and with overhead activity. He has been evaluated at both in urgent care and in emergency room, has been taking ibuprofen, naproxen using ice and heat without significant improvement of symptoms. 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 Patient states he now cares for his grandkids. He was evaluated by my partner Dr. Silviano Sandoval who ordered an MRI, unfortunately that was denied through his insurance due to lack of conservative treatment. Patient has not done a home exercise program or physical therapy, has not tried injections. He denies numbness and tingling down the arm, denies neck pain.         Pain Assessment:       Medical History:  Past Medical History:   Diagnosis Date    Arthritis     left knee    Diverticulitis     GERD (gastroesophageal reflux disease)     Kidney stones     Migraine      Past Surgical History:   Procedure Laterality Date    COLECTOMY  05/22/2018    Sigmoid    COLONOSCOPY  05/21/2018    diverticulosis    KNEE ARTHROSCOPY      WISDOM TOOTH EXTRACTION      WRIST SURGERY Right      Social History     Socioeconomic History    Marital status:      Spouse name: None    Number of children: None    Years of education: None    Highest education level: None   Occupational History    None   Social Needs    Financial resource strain: None    Food insecurity     Worry: None     Inability: None    Transportation needs     Medical: None     Non-medical: None   Tobacco Use    Smoking status: Never Smoker    Smokeless tobacco: Never Used   Substance and Sexual Activity    Alcohol use: No    Drug use: No    Sexual activity: Yes     Partners: Female   Lifestyle    Physical activity     Days per week: None     Minutes per session: None    Stress: None   Relationships    Social connections     Talks on phone: None     Gets together: None     Attends Yazdanism service: None     Active member of club or organization: None     Attends meetings of clubs or organizations: None     Relationship status: None    Intimate partner violence     Fear of current or ex partner: None     Emotionally abused: None     Physically abused: None     Forced sexual activity: None   Other Topics Concern    None   Social History Narrative    None     Allergies   Allergen Reactions    Ultram [Tramadol Hcl] Itching       Review of Systems:  Constitutional: negative  Respiratory: negative  Cardiovascular: negative  Musculoskeletal:negative except for New Patient (L shoulder pain )    Relevant review of systems reviewed and available in the patient's chart in media tab    Vital Signs:  Vitals:    10/06/20 0950   Weight: 200 lb (90.7 kg)   Height: 5' 9\" (1.753 m)         General Exam:   Constitutional: Patient is adequately groomed with no evidence of malnutrition  Vascular: Examination reveals no swelling or calf tenderness. Peripheral pulses are palpable and 2+. Neurological: The patient has good coordination. There is no weakness or sensory deficit. PHYSICAL EXAMINATION: Inspection of the left shoulder reveals warm, dry, intact skin. There is no adenopathy. The distal neurovascular exam is grossly intact. Range of motion is 160 degrees of active and passive forward flexion, although this motion is limited secondary to pain.   He has tenderness to palpation over the bicipital groove and greater tuberosity. He has 4- out of 5 strength and positive empty can test, 4+ out of 5 strength with infraspinatus and subscapularis testing. He has pain in all planes of motion. He does have pain with speeds and Yergason's. Strength is graded 5/5 in all other muscle groups. Examination of the contralateral shoulder reveals no atrophy or deformity. The skin is warm and dry. Range of motion is within normal limits. There is no focal tenderness with palpation. Provocative SLAP, biceps tension, apprehension AC joint or rotator cuff tests are negative. Strength is graded 5/5 in all muscle groups. The distal neurovascular exam is grossly intact. Cervical spine: The skin is warm and dry. There is no swelling, warmth, or erythema. Range of motion is within normal limits. There is no paraspinal or spinous process tenderness. Spurling's sign is negative and did not produce shoulder pain. The distal neurovascular exam is grossly intact. Additional Comments: Sensation is intact to light touch throughout the median, ulnar and radial nerve distribution. Able to wiggle fingers, give thumbs up, A-OK and cross index and middle fingers. X-RAYS:  3 views of the left shoulder are reviewed. There is no periosteal reaction, medullary lesions, or osteopenia. Glenohumeral space is well maintained, the acromioclavicular joint space is well-maintained, acromiohumeral space is well-maintained, type I acromion. The lung fields are clear. Assessment: Left shoulder pain concerning for rotator cuff tear, rotator cuff impingement    Impression:  Encounter Diagnoses   Name Primary?     Chronic left shoulder pain     Rotator cuff syndrome of left shoulder Yes       Office Procedures:  Orders Placed This Encounter   Procedures    [facility] - [facility] Physical Therapy     Referral Priority:   Routine     Referral Type:   Eval and Treat     Referral Reason:   Specialty Services Required     Requested Specialty:   Physical Therapy     Number of Visits Requested:   1     No orders of the defined types were placed in this encounter. Treatment Plan: Patient has had pain for 3 months not improved with conservative treatment, I do think an MRI is warranted at this point to rule out rotator cuff tear however patient's insurance dictates formal physical therapy. I did provide the patient with a home exercise program that he will begin for range of motion and strength. We will also get him set up with formal physical therapy. I provided a Medrol Dosepak and Voltaren cream, once he is completed those he will continue with the naproxen. He will follow-up with me in 6 weeks for reevaluation, if no improvement will reorder MRI at that time. Patient agrees with this plan, all of their questions were answered best of our ability and to their satisfaction.         Marta Dsouza

## 2020-10-07 ENCOUNTER — HOSPITAL ENCOUNTER (OUTPATIENT)
Dept: PHYSICAL THERAPY | Age: 45
Setting detail: THERAPIES SERIES
Discharge: HOME | End: 2020-10-07
Payer: MEDICAID

## 2020-12-07 ENCOUNTER — TELEPHONE (OUTPATIENT)
Dept: ORTHOPEDIC SURGERY | Age: 45
End: 2020-12-07

## 2020-12-08 ENCOUNTER — TELEPHONE (OUTPATIENT)
Dept: ORTHOPEDIC SURGERY | Age: 45
End: 2020-12-08

## 2020-12-08 NOTE — TELEPHONE ENCOUNTER
Returned a call to the patient about the voicemail they left for me. I told the patient that I won't be able to submit for anything until the insurance has been switched in our system, Patient is working on getting the information needed to switch the insurance coverage over and then I will resubmit for the MRI.

## 2020-12-10 ENCOUNTER — TELEPHONE (OUTPATIENT)
Dept: ORTHOPEDIC SURGERY | Age: 45
End: 2020-12-10

## 2020-12-14 ENCOUNTER — TELEPHONE (OUTPATIENT)
Dept: ORTHOPEDIC SURGERY | Age: 45
End: 2020-12-14

## 2020-12-14 NOTE — TELEPHONE ENCOUNTER
Called & talked with the patient, he has information from [address] [place] [address] on the yovana, but he does not have am actual card, we informed him to go ahead and follow up with us, we can at least update insurance and try to resubmit for the MRI again after. Patient agreed and we will see him on 12/15/2020 at [facility].

## 2020-12-15 ENCOUNTER — TELEPHONE (OUTPATIENT)
Dept: ORTHOPEDIC SURGERY | Age: 45
End: 2020-12-15

## 2020-12-15 ENCOUNTER — OFFICE VISIT (OUTPATIENT)
Dept: ORTHOPEDIC SURGERY | Age: 45
End: 2020-12-15
Payer: COMMERCIAL

## 2020-12-15 VITALS — WEIGHT: 200 LBS | BODY MASS INDEX: 29.62 KG/M2 | HEIGHT: 69 IN

## 2020-12-15 PROCEDURE — 99213 OFFICE O/P EST LOW 20 MIN: CPT | Performed by: ORTHOPAEDIC SURGERY

## 2020-12-15 PROCEDURE — G8427 DOCREV CUR MEDS BY ELIG CLIN: HCPCS | Performed by: ORTHOPAEDIC SURGERY

## 2020-12-15 PROCEDURE — G8419 CALC BMI OUT NRM PARAM NOF/U: HCPCS | Performed by: ORTHOPAEDIC SURGERY

## 2020-12-15 PROCEDURE — G8484 FLU IMMUNIZE NO ADMIN: HCPCS | Performed by: ORTHOPAEDIC SURGERY

## 2020-12-15 PROCEDURE — 1036F TOBACCO NON-USER: CPT | Performed by: ORTHOPAEDIC SURGERY

## 2020-12-15 NOTE — PROGRESS NOTES
Chief Complaint:  Follow-up (check left shoulder)      SUBJECTIVE:  Mc Soliz is a 54 y.o. male who returns today for evaluation of left shoulder pain. This began hurting in July, patient has been treating himself conservatively, he has done an extensive home exercise program.  He is awaiting MRI approval.  He continues to have 7 out of 10 pain, significant loss of motion and strength. States he needs help even getting dressed in the morning. Initial HPI:  Mc Soliz is a  38 y.o. right hand dominant male here regarding 3 months of left shoulder pain, patient states he was reaching across his body in his car to  an object when he felt a sharp pain and pop in the left shoulder. Since then he has had difficulty with overhead activity, 5 out of 10 dull achy pain sharp pain first thing in the morning and with overhead activity. He has been evaluated at both in urgent care and in emergency room, has been taking ibuprofen, naproxen using ice and heat without significant improvement of symptoms. 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 Patient states he now cares for his grandkids. He was evaluated by my partner Dr. Gunner Saavedra who ordered an MRI, unfortunately that was denied through his insurance due to lack of conservative treatment. Patient has not done a home exercise program or physical therapy, has not tried injections. He denies numbness and tingling down the arm, denies neck pain. Pain Assessment:         Medical History:  Patient's medications, allergies, past medical, surgical, social and family histories were reviewed and updated as appropriate.     Review of Systems:  Constitutional: negative  Respiratory: negative  Cardiovascular: negative  Musculoskeletal:negative except for Follow-up (check left shoulder) Relevant review of systems reviewed and available in the patient's chart in media tab    General Exam:   Constitutional: Patient is adequately groomed with no evidence of malnutrition  Mental Status: The patient is oriented to time, place and person. The patient's mood and affect are appropriate. Vascular: Examination reveals no swelling or calf tenderness. Peripheral pulses are palpable and 2+. OBJECTIVE:  Vital Signs:  Vitals:    12/15/20 0910   Weight: 200 lb (90.7 kg)   Height: 5' 9.02\" (1.753 m)       Appearance: alert, well appearing, and in no distress, oriented to person, place, and time and normal appearing weight. Physical exam:   Physical exam is limited due to pain and patient guarding, he has about 5 degrees of external rotation with his arm at his side. He has about 90 degrees of passive forward flexion. He has 4 out of 5 strength with supraspinatus testing, infraspinatus testing as well, he does have pain and positive empty can test.  5 out of 5 strength with subscapularis testing. Distal neurovascular exam is intact. Skin is clean dry and intact. Assessment : Left shoulder pain concerning for rotator cuff tear    Impression:  Encounter Diagnosis   Name Primary?  Left shoulder pain, unspecified chronicity Yes       Office Procedures:  Orders Placed This Encounter   Procedures    MRI SHOULDER LEFT WO CONTRAST     Standing Status:   Future     Standing Expiration Date:   12/15/2021     Scheduling Instructions:      Olivier Guevara             Address: [address]      Phone: [phone]: [phone]     Order Specific Question:   Reason for exam:     Answer:   r/o rtc left shoulder     No orders of the defined types were placed in this encounter.

## 2020-12-15 NOTE — TELEPHONE ENCOUNTER
Called & talked with the patient informing him that his MRI is scheduled for tomorrow, 12/16/2020 at [facility], he needs to arrive at 10:15AM and his scan will begin at 10:30AM. Patient went ahead and scheduled a f/u with Doctor Chintan Yousif at [facility] on 12/22/2020. Patient stated this would work for now, he can call back and change his f/u appointment if needed.

## 2020-12-22 ENCOUNTER — OFFICE VISIT (OUTPATIENT)
Dept: ORTHOPEDIC SURGERY | Age: 45
End: 2020-12-22
Payer: COMMERCIAL

## 2020-12-22 VITALS — WEIGHT: 200 LBS | HEIGHT: 69 IN | BODY MASS INDEX: 29.62 KG/M2

## 2020-12-22 PROCEDURE — G8427 DOCREV CUR MEDS BY ELIG CLIN: HCPCS | Performed by: ORTHOPAEDIC SURGERY

## 2020-12-22 PROCEDURE — G8484 FLU IMMUNIZE NO ADMIN: HCPCS | Performed by: ORTHOPAEDIC SURGERY

## 2020-12-22 PROCEDURE — G8419 CALC BMI OUT NRM PARAM NOF/U: HCPCS | Performed by: ORTHOPAEDIC SURGERY

## 2020-12-22 PROCEDURE — 99214 OFFICE O/P EST MOD 30 MIN: CPT | Performed by: ORTHOPAEDIC SURGERY

## 2020-12-22 PROCEDURE — 1036F TOBACCO NON-USER: CPT | Performed by: ORTHOPAEDIC SURGERY

## 2020-12-22 PROCEDURE — L3670 SO ACRO/CLAV CAN WEB PRE OTS: HCPCS | Performed by: ORTHOPAEDIC SURGERY

## 2020-12-22 RX ORDER — DICLOFENAC SODIUM 75 MG/1
75 TABLET, DELAYED RELEASE ORAL 2 TIMES DAILY
Qty: 60 TABLET | Refills: 3 | Status: ON HOLD | OUTPATIENT
Start: 2020-12-22 | End: 2021-01-22 | Stop reason: HOSPADM

## 2020-12-22 NOTE — PROGRESS NOTES
Chief Complaint:  Follow-up (TR MRI LEFT SHOULDER)      SUBJECTIVE:  Zoie Frost is a 54 y.o. male who returns today to review MRI results of the left shoulder. Continues to have significant pain especially with daily activities including getting dressed and sleeping. Initial HPI:  Zoie Frost is a  38 y.o. right hand dominant male here regarding 3 months of left shoulder pain, patient states he was reaching across his body in his car to  an object when he felt a sharp pain and pop in the left shoulder. Since then he has had difficulty with overhead activity, 5 out of 10 dull achy pain sharp pain first thing in the morning and with overhead activity. He has been evaluated at both in urgent care and in emergency room, has been taking ibuprofen, naproxen using ice and heat without significant improvement of symptoms. 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 Patient states he now cares for his grandkids. He was evaluated by my partner Dr. Tony Wood who ordered an MRI, unfortunately that was denied through his insurance due to lack of conservative treatment. Patient has not done a home exercise program or physical therapy, has not tried injections. He denies numbness and tingling down the arm, denies neck pain. Pain Assessment:         Medical History:  Patient's medications, allergies, past medical, surgical, social and family histories were reviewed and updated as appropriate.     Review of Systems:  Constitutional: negative  Respiratory: negative  Cardiovascular: negative  Musculoskeletal:negative except for Follow-up (TR MRI LEFT SHOULDER)    Relevant review of systems reviewed and available in the patient's chart in media tab    General Exam:   Constitutional: Patient is adequately groomed with no evidence of malnutrition Mental Status: The patient is oriented to time, place and person. The patient's mood and affect are appropriate. Vascular: Examination reveals no swelling or calf tenderness. Peripheral pulses are palpable and 2+. OBJECTIVE:  Vital Signs:  Vitals:    12/22/20 0859   Weight: 200 lb (90.7 kg)   Height: 5' 9.02\" (1.753 m)       Appearance: alert, well appearing, and in no distress, oriented to person, place, and time and normal appearing weight. Physical exam:   Physical exam remains unchanged, about 5 degrees of external rotation with his arm at side. 90 degrees of passive forward flexion. Active forward flexion continues to be limited secondary to patient guarding. 4+ out of 5 strength and positive empty can test with supraspinatus testing. Distal neurovascular exam is intact. Skin is clean dry and intact. MRI images of the left shoulder [facility] are personally reviewed and show:  High-grade partial-thickness supraspinatus tear measuring greater than 50% articular sided  Long head of the biceps tendon is intact  Component of adhesive capsulitis is present    Assessment : Left shoulder rotator cuff tear    Impression:  Encounter Diagnoses   Name Primary?  Tear of left rotator cuff, unspecified tear extent, unspecified whether traumatic Yes    Adhesive capsulitis of left shoulder        Office Procedures:  Orders Placed This Encounter   Procedures    Breg Sling Shot 2 Shoulder Sling     Patient was prescribed a Breg Sling Shot II Shoulder Brace. The left shoulder will require stabilization / immobilization from this orthosis. The orthosis will assist in protecting the affected area, provide functional support and facilitate healing. The device was ordered and fit on 12/22/2020. The patient was educated and fit by a healthcare professional with expert knowledge and specialization in brace application while under the direct supervision of the treating physician. Verbal and written instructions for the use of and application of this item were provided. They were instructed to contact the office immediately should the brace result in increased pain, decreased sensation, increased swelling or worsening of the condition. Procedures    Breg Sling Shot 2 Shoulder Sling     Patient was prescribed a Breg Sling Shot II Shoulder Brace. The left shoulder will require stabilization / immobilization from this orthosis. The orthosis will assist in protecting the affected area, provide functional support and facilitate healing. The device was ordered and fit on 12/22/2020. The patient was educated and fit by a healthcare professional with expert knowledge and specialization in brace application while under the direct supervision of the treating physician. Verbal and written instructions for the use of and application of this item were provided. They were instructed to contact the office immediately should the brace result in increased pain, decreased sensation, increased swelling or worsening of the condition. Treatment Plan: I had a lengthy discussion with the patient about the pathophysiology of high-grade partial-thickness rotator cuff tears, adhesive capsulitis and their treatment options. The 1st option would be to pursue no further treatment and continue living with their shoulder pain and dysfunction. The 2nd treatment option would be to pursue conservative treatment, including physical therapy, injections and oral medications.  The 3rd option would be to pursue surgical intervention including left shoulder video arthroscopy rotator cuff repair, possible augmentation with rotation medical graft, subacromial decompression, bicep work as needed, lysis of adhesions Risks and benefits of each option were discussed in great detail with the patient, at this time the patient would like to pursue surgical intervention. I spent 25+ minutes face to face with the patient including 13+ minutes discussing and answering questions regarding the risks, benefits, and complications of left shoulder video arthroscopy rotator cuff repair, possible augmentation with rotation medical graft, subacromial decompression, bicep work as needed, lysis of adhesions in detail. We talked about the risks of surgery which include but are not limited to infection, bleeding, nerve injury resulting in motor or sensory loss, DVT, RSD, persistent pain, loss of motion, functional instability, and need for further surgery. At no time were any guarantees implied or stated. The patient acknowledged these risks and electively reviewed and signed the consent form. Surgery will be scheduled for a mutually convenient time. Patient will obtain medical clearance from their PCP prior to surgery. Sling was provided today to wear postoperatively. Patient agrees with this plan, all of their questions were answered best of our ability and to their satisfaction.         Larry Blankenship

## 2020-12-23 ENCOUNTER — TELEPHONE (OUTPATIENT)
Dept: ORTHOPEDIC SURGERY | Age: 45
End: 2020-12-23

## 2020-12-23 NOTE — TELEPHONE ENCOUNTER
CPT: [id number], [id number]  BODY PART: left shoulder  AUTHORIZATION: NPR    Per website [email or web address]), [employer]

## 2020-12-30 ENCOUNTER — HOSPITAL ENCOUNTER (OUTPATIENT)
Age: 45
Discharge: HOME | End: 2020-12-30
Payer: COMMERCIAL

## 2020-12-30 LAB
ALBUMIN SERPL-MCNC: 4.2 G/DL (ref 3.4–5)
ANION GAP SERPL CALCULATED.3IONS-SCNC: 11 MMOL/L (ref 3–16)
APTT: 29.5 SEC (ref 24.2–36.2)
BASOPHILS ABSOLUTE: 0.1 K/UL (ref 0–0.2)
BASOPHILS RELATIVE PERCENT: 1.1 %
BUN BLDV-MCNC: 15 MG/DL (ref 7–20)
CALCIUM SERPL-MCNC: 9.5 MG/DL (ref 8.3–10.6)
CHLORIDE BLD-SCNC: 106 MMOL/L (ref 99–110)
CO2: 21 MMOL/L (ref 21–32)
CREAT SERPL-MCNC: 1.1 MG/DL (ref 0.9–1.3)
EOSINOPHILS ABSOLUTE: 0.4 K/UL (ref 0–0.6)
EOSINOPHILS RELATIVE PERCENT: 4.9 %
GFR AFRICAN AMERICAN: >60
GFR NON-AFRICAN AMERICAN: >60
GLUCOSE BLD-MCNC: 104 MG/DL (ref 70–99)
HCT VFR BLD CALC: 45.1 % (ref 40.5–52.5)
HEMOGLOBIN: 15.2 G/DL (ref 13.5–17.5)
INR BLD: 1.03 (ref 0.86–1.14)
LYMPHOCYTES ABSOLUTE: 2 K/UL (ref 1–5.1)
LYMPHOCYTES RELATIVE PERCENT: 23.3 %
MCH RBC QN AUTO: 31.7 PG (ref 26–34)
MCHC RBC AUTO-ENTMCNC: 33.8 G/DL (ref 31–36)
MCV RBC AUTO: 93.6 FL (ref 80–100)
MONOCYTES ABSOLUTE: 0.8 K/UL (ref 0–1.3)
MONOCYTES RELATIVE PERCENT: 8.7 %
NEUTROPHILS ABSOLUTE: 5.5 K/UL (ref 1.7–7.7)
NEUTROPHILS RELATIVE PERCENT: 62 %
PDW BLD-RTO: 12.9 % (ref 12.4–15.4)
PHOSPHORUS: 3.7 MG/DL (ref 2.5–4.9)
PLATELET # BLD: 214 K/UL (ref 135–450)
PMV BLD AUTO: 8.6 FL (ref 5–10.5)
POTASSIUM SERPL-SCNC: 4.1 MMOL/L (ref 3.5–5.1)
PROTHROMBIN TIME: 11.9 SEC (ref 10–13.2)
RBC # BLD: 4.81 M/UL (ref 4.2–5.9)
SODIUM BLD-SCNC: 138 MMOL/L (ref 136–145)
WBC # BLD: 8.8 K/UL (ref 4–11)

## 2020-12-30 PROCEDURE — 36415 COLL VENOUS BLD VENIPUNCTURE: CPT

## 2020-12-30 PROCEDURE — 85610 PROTHROMBIN TIME: CPT

## 2020-12-30 PROCEDURE — U0003 INFECTIOUS AGENT DETECTION BY NUCLEIC ACID (DNA OR RNA); SEVERE ACUTE RESPIRATORY SYNDROME CORONAVIRUS 2 (SARS-COV-2) (CORONAVIRUS DISEASE [COVID-19]), AMPLIFIED PROBE TECHNIQUE, MAKING USE OF HIGH THROUGHPUT TECHNOLOGIES AS DESCRIBED BY CMS-2020-01-R: HCPCS

## 2020-12-30 PROCEDURE — 80069 RENAL FUNCTION PANEL: CPT

## 2020-12-30 PROCEDURE — 85025 COMPLETE CBC W/AUTO DIFF WBC: CPT

## 2020-12-30 PROCEDURE — 85730 THROMBOPLASTIN TIME PARTIAL: CPT

## 2020-12-31 LAB — SARS-COV-2, PCR: DETECTED

## 2021-01-05 ENCOUNTER — TELEPHONE (OUTPATIENT)
Dept: ORTHOPEDIC SURGERY | Age: 46
End: 2021-01-05

## 2021-01-12 ENCOUNTER — HOSPITAL ENCOUNTER (OUTPATIENT)
Dept: PHYSICAL THERAPY | Age: 46
Setting detail: THERAPIES SERIES
End: 2021-01-12
Payer: COMMERCIAL

## 2021-01-21 ENCOUNTER — ANESTHESIA EVENT (OUTPATIENT)
Dept: OPERATING ROOM | Age: 46
End: 2021-01-21
Payer: COMMERCIAL

## 2021-01-22 ENCOUNTER — ANESTHESIA (OUTPATIENT)
Dept: OPERATING ROOM | Age: 46
End: 2021-01-22
Payer: COMMERCIAL

## 2021-01-22 ENCOUNTER — HOSPITAL ENCOUNTER (OUTPATIENT)
Age: 46
Setting detail: OUTPATIENT SURGERY
Discharge: HOME | End: 2021-01-22
Attending: ORTHOPAEDIC SURGERY | Admitting: ORTHOPAEDIC SURGERY
Payer: COMMERCIAL

## 2021-01-22 VITALS
HEIGHT: 69 IN | BODY MASS INDEX: 29.62 KG/M2 | OXYGEN SATURATION: 92 % | SYSTOLIC BLOOD PRESSURE: 115 MMHG | TEMPERATURE: 98 F | HEART RATE: 94 BPM | DIASTOLIC BLOOD PRESSURE: 85 MMHG | WEIGHT: 200 LBS | RESPIRATION RATE: 16 BRPM

## 2021-01-22 VITALS
DIASTOLIC BLOOD PRESSURE: 38 MMHG | SYSTOLIC BLOOD PRESSURE: 82 MMHG | OXYGEN SATURATION: 84 % | RESPIRATION RATE: 8 BRPM

## 2021-01-22 DIAGNOSIS — S46.012A TRAUMATIC COMPLETE TEAR OF LEFT ROTATOR CUFF, INITIAL ENCOUNTER: Primary | ICD-10-CM

## 2021-01-22 PROCEDURE — 2709999900 HC NON-CHARGEABLE SUPPLY: Performed by: ORTHOPAEDIC SURGERY

## 2021-01-22 PROCEDURE — 6370000000 HC RX 637 (ALT 250 FOR IP): Performed by: ANESTHESIOLOGY

## 2021-01-22 PROCEDURE — 7100000001 HC PACU RECOVERY - ADDTL 15 MIN: Performed by: ORTHOPAEDIC SURGERY

## 2021-01-22 PROCEDURE — 2500000003 HC RX 250 WO HCPCS: Performed by: NURSE ANESTHETIST, CERTIFIED REGISTERED

## 2021-01-22 PROCEDURE — 2720000010 HC SURG SUPPLY STERILE: Performed by: ORTHOPAEDIC SURGERY

## 2021-01-22 PROCEDURE — 7100000000 HC PACU RECOVERY - FIRST 15 MIN: Performed by: ORTHOPAEDIC SURGERY

## 2021-01-22 PROCEDURE — C1762 CONN TISS, HUMAN(INC FASCIA): HCPCS | Performed by: ORTHOPAEDIC SURGERY

## 2021-01-22 PROCEDURE — 3600000014 HC SURGERY LEVEL 4 ADDTL 15MIN: Performed by: ORTHOPAEDIC SURGERY

## 2021-01-22 PROCEDURE — 3700000001 HC ADD 15 MINUTES (ANESTHESIA): Performed by: ORTHOPAEDIC SURGERY

## 2021-01-22 PROCEDURE — 7100000010 HC PHASE II RECOVERY - FIRST 15 MIN: Performed by: ORTHOPAEDIC SURGERY

## 2021-01-22 PROCEDURE — 6360000002 HC RX W HCPCS: Performed by: NURSE ANESTHETIST, CERTIFIED REGISTERED

## 2021-01-22 PROCEDURE — 2500000003 HC RX 250 WO HCPCS

## 2021-01-22 PROCEDURE — 7100000011 HC PHASE II RECOVERY - ADDTL 15 MIN: Performed by: ORTHOPAEDIC SURGERY

## 2021-01-22 PROCEDURE — C1713 ANCHOR/SCREW BN/BN,TIS/BN: HCPCS | Performed by: ORTHOPAEDIC SURGERY

## 2021-01-22 PROCEDURE — 2580000003 HC RX 258: Performed by: ANESTHESIOLOGY

## 2021-01-22 PROCEDURE — 6370000000 HC RX 637 (ALT 250 FOR IP)

## 2021-01-22 PROCEDURE — 3600000004 HC SURGERY LEVEL 4 BASE: Performed by: ORTHOPAEDIC SURGERY

## 2021-01-22 PROCEDURE — 3700000000 HC ANESTHESIA ATTENDED CARE: Performed by: ORTHOPAEDIC SURGERY

## 2021-01-22 PROCEDURE — 2580000003 HC RX 258: Performed by: ORTHOPAEDIC SURGERY

## 2021-01-22 PROCEDURE — 6360000002 HC RX W HCPCS: Performed by: ORTHOPAEDIC SURGERY

## 2021-01-22 DEVICE — IMPLANTABLE DEVICE
Type: IMPLANTABLE DEVICE | Site: SHOULDER | Status: FUNCTIONAL
Brand: BIOINDUCTIVE IMPLANT WITH ARTHROSCOPIC DELIVERY SYSTEM - LARGE

## 2021-01-22 DEVICE — Z INACTIVE USE 2600835 ANCHOR TEND 8 FOR REGENETEN BIOINDUCTIVE IMPL SYS: Type: IMPLANTABLE DEVICE | Site: SHOULDER | Status: FUNCTIONAL

## 2021-01-22 DEVICE — BONE ANCHORS 3 WITH ARTHROSCOPIC DELIVERY SYSTEM ADVANCED
Type: IMPLANTABLE DEVICE | Site: SHOULDER | Status: FUNCTIONAL
Brand: BONE ANCHORS WITH ARTHROSCOPIC DELIVERY SYSTEM - ADVANCED

## 2021-01-22 RX ORDER — FENTANYL CITRATE 50 UG/ML
INJECTION, SOLUTION INTRAMUSCULAR; INTRAVENOUS PRN
Status: DISCONTINUED | OUTPATIENT
Start: 2021-01-22 | End: 2021-01-22 | Stop reason: SDUPTHER

## 2021-01-22 RX ORDER — LIDOCAINE HYDROCHLORIDE 20 MG/ML
INJECTION, SOLUTION EPIDURAL; INFILTRATION; INTRACAUDAL; PERINEURAL PRN
Status: DISCONTINUED | OUTPATIENT
Start: 2021-01-22 | End: 2021-01-22 | Stop reason: SDUPTHER

## 2021-01-22 RX ORDER — DIPHENHYDRAMINE HYDROCHLORIDE 50 MG/ML
12.5 INJECTION INTRAMUSCULAR; INTRAVENOUS
Status: DISCONTINUED | OUTPATIENT
Start: 2021-01-22 | End: 2021-01-22 | Stop reason: HOSPADM

## 2021-01-22 RX ORDER — MIDAZOLAM HYDROCHLORIDE 1 MG/ML
INJECTION INTRAMUSCULAR; INTRAVENOUS
Status: DISCONTINUED
Start: 2021-01-22 | End: 2021-01-22 | Stop reason: HOSPADM

## 2021-01-22 RX ORDER — ONDANSETRON 2 MG/ML
INJECTION INTRAMUSCULAR; INTRAVENOUS PRN
Status: DISCONTINUED | OUTPATIENT
Start: 2021-01-22 | End: 2021-01-22 | Stop reason: SDUPTHER

## 2021-01-22 RX ORDER — EPHEDRINE SULFATE 50 MG/ML
INJECTION INTRAVENOUS PRN
Status: DISCONTINUED | OUTPATIENT
Start: 2021-01-22 | End: 2021-01-22 | Stop reason: SDUPTHER

## 2021-01-22 RX ORDER — ROCURONIUM BROMIDE 10 MG/ML
INJECTION, SOLUTION INTRAVENOUS PRN
Status: DISCONTINUED | OUTPATIENT
Start: 2021-01-22 | End: 2021-01-22 | Stop reason: SDUPTHER

## 2021-01-22 RX ORDER — DEXAMETHASONE SODIUM PHOSPHATE 4 MG/ML
INJECTION, SOLUTION INTRA-ARTICULAR; INTRALESIONAL; INTRAMUSCULAR; INTRAVENOUS; SOFT TISSUE PRN
Status: DISCONTINUED | OUTPATIENT
Start: 2021-01-22 | End: 2021-01-22 | Stop reason: SDUPTHER

## 2021-01-22 RX ORDER — ACETAMINOPHEN 500 MG
1000 TABLET ORAL ONCE
Status: COMPLETED | OUTPATIENT
Start: 2021-01-22 | End: 2021-01-22

## 2021-01-22 RX ORDER — OXYCODONE HYDROCHLORIDE AND ACETAMINOPHEN 5; 325 MG/1; MG/1
2 TABLET ORAL PRN
Status: COMPLETED | OUTPATIENT
Start: 2021-01-22 | End: 2021-01-22

## 2021-01-22 RX ORDER — BUPIVACAINE HYDROCHLORIDE 5 MG/ML
INJECTION, SOLUTION EPIDURAL; INTRACAUDAL
Status: DISCONTINUED
Start: 2021-01-22 | End: 2021-01-22 | Stop reason: HOSPADM

## 2021-01-22 RX ORDER — LIDOCAINE HYDROCHLORIDE 10 MG/ML
INJECTION, SOLUTION EPIDURAL; INFILTRATION; INTRACAUDAL; PERINEURAL
Status: COMPLETED
Start: 2021-01-22 | End: 2021-01-22

## 2021-01-22 RX ORDER — HYDRALAZINE HYDROCHLORIDE 20 MG/ML
5 INJECTION INTRAMUSCULAR; INTRAVENOUS EVERY 10 MIN PRN
Status: DISCONTINUED | OUTPATIENT
Start: 2021-01-22 | End: 2021-01-22 | Stop reason: HOSPADM

## 2021-01-22 RX ORDER — LABETALOL HYDROCHLORIDE 5 MG/ML
5 INJECTION, SOLUTION INTRAVENOUS EVERY 10 MIN PRN
Status: DISCONTINUED | OUTPATIENT
Start: 2021-01-22 | End: 2021-01-22 | Stop reason: HOSPADM

## 2021-01-22 RX ORDER — MORPHINE SULFATE 2 MG/ML
1 INJECTION, SOLUTION INTRAMUSCULAR; INTRAVENOUS EVERY 5 MIN PRN
Status: DISCONTINUED | OUTPATIENT
Start: 2021-01-22 | End: 2021-01-22 | Stop reason: HOSPADM

## 2021-01-22 RX ORDER — OXYCODONE HYDROCHLORIDE AND ACETAMINOPHEN 5; 325 MG/1; MG/1
1 TABLET ORAL EVERY 6 HOURS PRN
Qty: 28 TABLET | Refills: 0 | Status: SHIPPED | OUTPATIENT
Start: 2021-01-22 | End: 2021-01-29

## 2021-01-22 RX ORDER — SODIUM CHLORIDE, SODIUM LACTATE, POTASSIUM CHLORIDE, CALCIUM CHLORIDE 600; 310; 30; 20 MG/100ML; MG/100ML; MG/100ML; MG/100ML
INJECTION, SOLUTION INTRAVENOUS CONTINUOUS
Status: DISCONTINUED | OUTPATIENT
Start: 2021-01-22 | End: 2021-01-22 | Stop reason: HOSPADM

## 2021-01-22 RX ORDER — PROMETHAZINE HYDROCHLORIDE 25 MG/ML
6.25 INJECTION, SOLUTION INTRAMUSCULAR; INTRAVENOUS
Status: DISCONTINUED | OUTPATIENT
Start: 2021-01-22 | End: 2021-01-22 | Stop reason: HOSPADM

## 2021-01-22 RX ORDER — OXYCODONE HYDROCHLORIDE AND ACETAMINOPHEN 5; 325 MG/1; MG/1
1 TABLET ORAL PRN
Status: COMPLETED | OUTPATIENT
Start: 2021-01-22 | End: 2021-01-22

## 2021-01-22 RX ORDER — ONDANSETRON 2 MG/ML
4 INJECTION INTRAMUSCULAR; INTRAVENOUS
Status: DISCONTINUED | OUTPATIENT
Start: 2021-01-22 | End: 2021-01-22 | Stop reason: HOSPADM

## 2021-01-22 RX ORDER — ACETAMINOPHEN 500 MG
TABLET ORAL
Status: COMPLETED
Start: 2021-01-22 | End: 2021-01-22

## 2021-01-22 RX ORDER — EPINEPHRINE 1 MG/ML
INJECTION, SOLUTION, CONCENTRATE INTRAVENOUS
Status: DISCONTINUED
Start: 2021-01-22 | End: 2021-01-22 | Stop reason: HOSPADM

## 2021-01-22 RX ORDER — MORPHINE SULFATE 2 MG/ML
2 INJECTION, SOLUTION INTRAMUSCULAR; INTRAVENOUS EVERY 5 MIN PRN
Status: DISCONTINUED | OUTPATIENT
Start: 2021-01-22 | End: 2021-01-22 | Stop reason: HOSPADM

## 2021-01-22 RX ORDER — PROPOFOL 10 MG/ML
INJECTION, EMULSION INTRAVENOUS PRN
Status: DISCONTINUED | OUTPATIENT
Start: 2021-01-22 | End: 2021-01-22 | Stop reason: SDUPTHER

## 2021-01-22 RX ADMIN — SODIUM CHLORIDE, POTASSIUM CHLORIDE, SODIUM LACTATE AND CALCIUM CHLORIDE: 600; 310; 30; 20 INJECTION, SOLUTION INTRAVENOUS at 09:27

## 2021-01-22 RX ADMIN — OXYCODONE HYDROCHLORIDE AND ACETAMINOPHEN 1 TABLET: 5; 325 TABLET ORAL at 12:56

## 2021-01-22 RX ADMIN — ACETAMINOPHEN 1000 MG: 500 TABLET, FILM COATED ORAL at 09:27

## 2021-01-22 RX ADMIN — EPHEDRINE SULFATE 10 MG: 50 INJECTION INTRAVENOUS at 11:46

## 2021-01-22 RX ADMIN — SUGAMMADEX 200 MG: 100 INJECTION, SOLUTION INTRAVENOUS at 12:04

## 2021-01-22 RX ADMIN — Medication 2 G: at 10:57

## 2021-01-22 RX ADMIN — FENTANYL CITRATE 75 MCG: 50 INJECTION INTRAMUSCULAR; INTRAVENOUS at 11:11

## 2021-01-22 RX ADMIN — EPHEDRINE SULFATE 10 MG: 50 INJECTION INTRAVENOUS at 11:20

## 2021-01-22 RX ADMIN — PROPOFOL 200 MG: 10 INJECTION, EMULSION INTRAVENOUS at 10:59

## 2021-01-22 RX ADMIN — LIDOCAINE HYDROCHLORIDE 0.1 ML: 10 INJECTION, SOLUTION EPIDURAL; INFILTRATION; INTRACAUDAL; PERINEURAL at 09:27

## 2021-01-22 RX ADMIN — FENTANYL CITRATE 100 MCG: 50 INJECTION INTRAMUSCULAR; INTRAVENOUS at 10:59

## 2021-01-22 RX ADMIN — LIDOCAINE HYDROCHLORIDE 50 MG: 20 INJECTION, SOLUTION EPIDURAL; INFILTRATION; INTRACAUDAL; PERINEURAL at 10:59

## 2021-01-22 RX ADMIN — ROCURONIUM BROMIDE 50 MG: 10 INJECTION, SOLUTION INTRAVENOUS at 10:59

## 2021-01-22 RX ADMIN — Medication 1000 MG: at 09:27

## 2021-01-22 RX ADMIN — DEXAMETHASONE SODIUM PHOSPHATE 8 MG: 4 INJECTION, SOLUTION INTRAMUSCULAR; INTRAVENOUS at 11:14

## 2021-01-22 RX ADMIN — ONDANSETRON 4 MG: 2 INJECTION, SOLUTION INTRAMUSCULAR; INTRAVENOUS at 11:14

## 2021-01-22 RX ADMIN — FENTANYL CITRATE 25 MCG: 50 INJECTION INTRAMUSCULAR; INTRAVENOUS at 12:04

## 2021-01-22 ASSESSMENT — PULMONARY FUNCTION TESTS
PIF_VALUE: 5
PIF_VALUE: 21
PIF_VALUE: 22
PIF_VALUE: 21
PIF_VALUE: 25
PIF_VALUE: 22
PIF_VALUE: 21
PIF_VALUE: 21
PIF_VALUE: 26
PIF_VALUE: 20
PIF_VALUE: 5
PIF_VALUE: 3
PIF_VALUE: 21
PIF_VALUE: 22
PIF_VALUE: 21
PIF_VALUE: 0
PIF_VALUE: 21
PIF_VALUE: 4
PIF_VALUE: 22
PIF_VALUE: 21
PIF_VALUE: 5
PIF_VALUE: 21
PIF_VALUE: 15
PIF_VALUE: 6
PIF_VALUE: 12
PIF_VALUE: 1
PIF_VALUE: 2
PIF_VALUE: 22
PIF_VALUE: 0
PIF_VALUE: 22
PIF_VALUE: 21
PIF_VALUE: 22
PIF_VALUE: 22
PIF_VALUE: 21
PIF_VALUE: 21
PIF_VALUE: 19
PIF_VALUE: 22
PIF_VALUE: 1
PIF_VALUE: 5
PIF_VALUE: 21
PIF_VALUE: 24
PIF_VALUE: 21
PIF_VALUE: 20
PIF_VALUE: 16

## 2021-01-22 ASSESSMENT — PAIN DESCRIPTION - ORIENTATION: ORIENTATION: LEFT

## 2021-01-22 ASSESSMENT — PAIN DESCRIPTION - PAIN TYPE: TYPE: SURGICAL PAIN

## 2021-01-22 ASSESSMENT — PAIN SCALES - GENERAL: PAINLEVEL_OUTOF10: 3

## 2021-01-22 ASSESSMENT — PAIN - FUNCTIONAL ASSESSMENT
PAIN_FUNCTIONAL_ASSESSMENT: 0-10
PAIN_FUNCTIONAL_ASSESSMENT: PREVENTS OR INTERFERES SOME ACTIVE ACTIVITIES AND ADLS

## 2021-01-22 NOTE — H&P
I have reviewed the history and physical and examined the patient and find no relevant changes. I have reviewed with the patient and/or family the risks, benefits, and alternatives to the procedure. Past Medical History:   Diagnosis Date    Arthritis     left knee    COVID-19 12/30/2020    Diverticulitis     GERD (gastroesophageal reflux disease)     Kidney stones     Migraine        Past Surgical History:   Procedure Laterality Date    COLECTOMY  05/22/2018    Sigmoid    COLONOSCOPY  05/21/2018    diverticulosis    KNEE ARTHROSCOPY      WISDOM TOOTH EXTRACTION      WRIST SURGERY Right        Scheduled Meds:   ceFAZolin  2 g Intravenous Once    midazolam        bupivacaine (PF)        EPINEPHrine PF         Continuous Infusions:   lactated ringers 50 mL/hr at 01/22/21 0927     PRN Meds:. Allergies   Allergen Reactions    Ultram [Tramadol Hcl] Itching       Vitals:    01/22/21 0915   BP: (!) 130/99   Pulse: 82   Resp: 16   Temp: 97.2 °F (36.2 °C)   SpO2: 100%         The patient was counseled at length about the risks of xiao Covid-19 during their perioperative period and any recovery window from their procedure. The patient was made aware that xiao Covid-19  may worsen their prognosis for recovering from their procedure  and lend to a higher morbidity and/or mortality risk. All material risks, benefits, and reasonable alternatives including postponing the procedure were discussed. The patient does wish to proceed with the procedure at this time.             Daniela Zacarias DO  1/22/2021

## 2021-01-22 NOTE — ANESTHESIA PRE PROCEDURE
Department of Anesthesiology  Preprocedure Note       Name:  Parvez Palma   Age:  54 y.o.  :  1975                                          MRN:  [id number]         Date:  2021      Surgeon: Opal Griffin):  Marlen Vera DO    Procedure: Procedure(s):  LEFT SHOULDER VIDEO ARTHROSCOPY ROTATOR CUFF REPAIR, POSSIBLE AUGMENTATION WITH ROTATION MEDICAL GRAFT, SUBACROMIAL DECOMPRESSION BICEP WORK AS NEEDED    Medications prior to admission:   Prior to Admission medications    Medication Sig Start Date End Date Taking? Authorizing Provider   diclofenac (VOLTAREN) 75 MG EC tablet Take 1 tablet by mouth 2 times daily 20  Yes Marlen Vera DO   nortriptyline (PAMELOR) 10 MG capsule Take 10 mg by mouth nightly   Yes Historical Provider, MD   rizatriptan (MAXALT) 10 MG tablet Take 10 mg by mouth once as needed for Migraine May repeat in 2 hours if needed   Yes Historical Provider, MD   topiramate (TOPAMAX) 50 MG tablet TAKE 1 TABLET BY MOUTH TWICE DAILY 19  Yes Historical Provider, MD   methylPREDNISolone (MEDROL, HUMBERTO,) 4 MG tablet Take by mouth.  10/6/20   Marlen Vera DO   diclofenac sodium (VOLTAREN) 1 % GEL Apply 2 g topically 4 times daily 10/6/20   Jc Burris DO   magnesium oxide (MAG-OX) 400 MG tablet Take 400 mg by mouth daily    Historical Provider, MD   predniSONE (DELTASONE) 10 MG tablet Take 3 tabs bid x2days, then 2 tabs bid x2days, then 1 tab bid x2days, then 1 tab qd x2days 19   F Wray Litten, MD   butalbital-acetaminophen-caffeine ([place], [place]) -32 MG per tablet  19   Historical Provider, MD       Current medications:    Current Facility-Administered Medications   Medication Dose Route Frequency Provider Last Rate Last Admin    midazolam (VERSED) 2 MG/2ML injection             bupivacaine (PF) (MARCAINE) 0.5 % injection             EPINEPHrine PF 1 MG/ML injection  ceFAZolin (ANCEF) 2000 mg in sterile water 20 mL IV syringe  2 g Intravenous Once Amelia Burris,         lactated ringers infusion   Intravenous Continuous Blanca Aleman MD 50 mL/hr at 01/22/21 0927 New Bag at 01/22/21 4507       Allergies: Allergies   Allergen Reactions    Ultram [Tramadol Hcl] Itching       Problem List:    Patient Active Problem List   Diagnosis Code    Sigmoid diverticulosis K57.30       Past Medical History:        Diagnosis Date    Arthritis     left knee    COVID-19 12/30/2020    Diverticulitis     GERD (gastroesophageal reflux disease)     Kidney stones     Migraine        Past Surgical History:        Procedure Laterality Date    COLECTOMY  05/22/2018    Sigmoid    COLONOSCOPY  05/21/2018    diverticulosis    KNEE ARTHROSCOPY      WISDOM TOOTH EXTRACTION      WRIST SURGERY Right        Social History:    Social History     Tobacco Use    Smoking status: Never Smoker    Smokeless tobacco: Never Used   Substance Use Topics    Alcohol use: No                                Counseling given: Not Answered      Vital Signs (Current):   Vitals:    01/22/21 0915   BP: (!) 130/99   Pulse: 82   Resp: 16   Temp: 97.2 °F (36.2 °C)   TempSrc: Temporal   SpO2: 100%   Weight: 200 lb (90.7 kg)   Height: 5' 9\" (1.753 m)                                              BP Readings from Last 3 Encounters:   01/22/21 (!) 130/99   06/18/19 (!) 130/92   03/29/19 130/88       NPO Status: Time of last liquid consumption: 1930                        Time of last solid consumption: 1930                        Date of last liquid consumption: 01/21/21                        Date of last solid food consumption: 01/21/21    BMI:   Wt Readings from Last 3 Encounters:   01/22/21 200 lb (90.7 kg)   12/22/20 200 lb (90.7 kg)   12/15/20 200 lb (90.7 kg)     Body mass index is 29.53 kg/m².     CBC:   Lab Results   Component Value Date    WBC 8.8 12/30/2020    RBC 4.81 12/30/2020 HGB 15.2 12/30/2020    HCT 45.1 12/30/2020    MCV 93.6 12/30/2020    RDW 12.9 12/30/2020     12/30/2020       CMP:   Lab Results   Component Value Date     12/30/2020    K 4.1 12/30/2020     12/30/2020    CO2 21 12/30/2020    BUN 15 12/30/2020    CREATININE 1.1 12/30/2020    GFRAA >60 12/30/2020    GFRAA >60 05/01/2013    AGRATIO 1.3 06/18/2019    LABGLOM >60 12/30/2020    GLUCOSE 104 12/30/2020    PROT 7.0 06/18/2019    PROT 8.5 11/17/2010    CALCIUM 9.5 12/30/2020    BILITOT 0.5 06/18/2019    ALKPHOS 66 06/18/2019    AST 15 06/18/2019    ALT 28 06/18/2019       POC Tests: No results for input(s): POCGLU, POCNA, POCK, POCCL, POCBUN, POCHEMO, POCHCT in the last 72 hours. Coags:   Lab Results   Component Value Date    PROTIME 11.9 12/30/2020    INR 1.03 12/30/2020    APTT 29.5 12/30/2020       HCG (If Applicable): No results found for: PREGTESTUR, PREGSERUM, HCG, HCGQUANT     ABGs: No results found for: PHART, PO2ART, TOF9RXC, VJC8JMN, BEART, S0REAXRH     Type & Screen (If Applicable):  No results found for: LABABO, LABRH    Drug/Infectious Status (If Applicable):  No results found for: HIV, HEPCAB    COVID-19 Screening (If Applicable):   Lab Results   Component Value Date    COVID19 DETECTED 12/30/2020         Anesthesia Evaluation   no history of anesthetic complications:   Airway: Mallampati: II  TM distance: >3 FB   Neck ROM: full  Mouth opening: > = 3 FB Dental: normal exam         Pulmonary:Negative Pulmonary ROS                              Cardiovascular:Negative CV ROS                      Neuro/Psych:   (+) headaches: migraine headaches,             GI/Hepatic/Renal:   (+) GERD: well controlled, renal disease: kidney stones,           Endo/Other:    (+) : arthritis: OA., .                 Abdominal:           Vascular: negative vascular ROS.                                        Anesthesia Plan      general     ASA 2 (Pt agrees to risks, benefits and alternatives of GETA for operative care as well as an interscalene nerve block for post-operative analgesia. Questions answered. Willing to proceed.)  Induction: intravenous. Anesthetic plan and risks discussed with patient.                       Keysha Cormier MD   1/22/2021

## 2021-01-22 NOTE — OP NOTE
Diagnostic Shoulder Arthroscopy with Rotator Cuff Repair     Rolando Faith ([birth year])    [facility]#:  [id number]    Date of Surgery- 1/22/2021      Preoperative Diagnosis-  1. Rotator cuff tear left shoulder           2. Outlet impingement left shoulder           3. Adhesive capsulitis left shoulder                                            Postoperative Diagnosis-  1. Rotator cuff tear left shoulder           2. Outlet impingement left shoulder       3. Adhesive capsulitis left shoulder    Procedure-   1. Diagnostic arthroscopy left shoulder (CPT 60411)            2. Arthroscopic rotator cuff repair left shoulder (RRF-29724) (CPT 36496 [facility])     3.  Subacromial decompression (CPT- 76904)    4. Lysis of adhesions (CPT- 18000)          Surgeon-  Anders Hamman, DO    Primary Assistant- Surgical Assistant: Violetta Montes  Scrub Person First: Jennifer Barrios    Anesthesia- Scalene and General    EBL: minimal    Implants-  Rotations Medical bovine xenograft    Indications for Operation  Persistent shoulder pain with an MRI confirmed rotator cuff tear. The patient chose to proceed with the aforementioned procedures. At no time were any guarantees implied or stated. Site Marking and Surgical Prep  The patient was seen in the holding area and the appropriate extremity marked with a pen. Interscalene block was administered by the anesthesia department. The patient was taken to the operative suite, identified, placed on the operating room table in the supine position. After induction of general anesthesia, the patient was placed in the beach chair position with all bony prominences adequately padded and the head and neck anatomically supported. Examination  Under Anesthesia. Decreased range of motion noted with in all planes, 90 degrees of forward flexion and 0 degrees external rotation. Closed manipulation performed resulting in forward flexion  Of 180 degrees and 80 degrees of external rotation.   No

## 2021-01-22 NOTE — ANESTHESIA POSTPROCEDURE EVALUATION
Department of Anesthesiology  Postprocedure Note    Patient: Donovan Kinsey  MRN: [id number]  YOB: 1975  Date of evaluation: 1/22/2021  Time:  1:26 PM     Procedure Summary     Date: 01/22/21 Room / Location: [facility] EG OR 01 / [facility]    Anesthesia Start: 1055 Anesthesia Stop: 1227    Procedure: LEFT SHOULDER VIDEO ARTHROSCOPY ROTATOR CUFF REPAIR,  AUGMENTATION WITH ROTATION MEDICAL GRAFT, SUBACROMIAL DECOMPRESSION ,LYSIS OF ADHESIONS (Left Shoulder) Diagnosis: (TEAR OF LEFT ROATOR CUFF, UNSPECIFIED TEAR EXTENT)    Surgeons: Carl Shanks DO Responsible Provider: Jazmín Cazares MD    Anesthesia Type: general ASA Status: 2          Anesthesia Type: general    Fariba Phase I: Fariba Score: 10    Fariba Phase II: Fariba Score: 10    Last vitals: Reviewed and per EMR flowsheets. Anesthesia Post Evaluation    Patient location during evaluation: PACU  Patient participation: complete - patient participated  Level of consciousness: awake and alert  Airway patency: patent  Nausea & Vomiting: no nausea and no vomiting  Complications: no  Cardiovascular status: blood pressure returned to baseline  Respiratory status: acceptable  Hydration status: euvolemic  Comments: VSS on transfer to phase 2 recovery. No anesthetic complications.

## 2021-01-26 ENCOUNTER — HOSPITAL ENCOUNTER (OUTPATIENT)
Dept: PHYSICAL THERAPY | Age: 46
Setting detail: THERAPIES SERIES
Discharge: HOME | End: 2021-01-26
Payer: COMMERCIAL

## 2021-01-26 ENCOUNTER — OFFICE VISIT (OUTPATIENT)
Dept: ORTHOPEDIC SURGERY | Age: 46
End: 2021-01-26

## 2021-01-26 VITALS — HEIGHT: 69 IN | BODY MASS INDEX: 29.62 KG/M2 | WEIGHT: 200 LBS

## 2021-01-26 DIAGNOSIS — Z98.890 S/P ARTHROSCOPY OF LEFT SHOULDER: Primary | ICD-10-CM

## 2021-01-26 PROCEDURE — 97110 THERAPEUTIC EXERCISES: CPT

## 2021-01-26 PROCEDURE — 99024 POSTOP FOLLOW-UP VISIT: CPT | Performed by: ORTHOPAEDIC SURGERY

## 2021-01-26 PROCEDURE — 97161 PT EVAL LOW COMPLEX 20 MIN: CPT

## 2021-01-26 PROCEDURE — 97016 VASOPNEUMATIC DEVICE THERAPY: CPT

## 2021-01-26 PROCEDURE — 97140 MANUAL THERAPY 1/> REGIONS: CPT

## 2021-01-26 RX ORDER — OXYCODONE HYDROCHLORIDE AND ACETAMINOPHEN 5; 325 MG/1; MG/1
1 TABLET ORAL EVERY 6 HOURS PRN
Qty: 12 TABLET | Refills: 0 | Status: SHIPPED | OUTPATIENT
Start: 2021-01-26 | End: 2021-01-29

## 2021-01-26 NOTE — PROGRESS NOTES
HISTORY OF PRESENT ILLNESS: The patient returns today for the first postoperative visit after left shoulder video arthroscopy rotator cuff repair with rotation medical graft only, manipulation under anesthesia, lysis of adhesions and subacromial decompression. Pain control has been satisfactory with oral medications. There have been no fevers or chills. DOS: 1/22/21     ]          PHYSICAL EXAMINATION: Inspection reveals expected swelling. Portal sites are clean and dry. The skin is warm. Range of motion is limited by pain and swelling. The distal neurovascular exam is grossly intact. ASSESSMENT/PLAN: Doing well after left shoulder video arthroscopy rotator cuff repair with rotation medical graft only, manipulation under anesthesia, lysis of adhesions and subacromial decompression. He will begin to wean out of the sling as pain allows over the next 1 to 2 weeks given his significant loss of motion at time of surgery I do recommend extensive physical therapy to maintain motion. I reviewed and demonstrated the standard postoperative exercises and provided them with a prescription for physical therapy. I have recommended ice and judicious use of narcotics. The sling will be worn at all times except for hygiene and the prescribed exercises. They will follow up in approximately six weeks for repeat evaluation. Diagnosis Orders   1.  S/P arthroscopy of left shoulder  oxyCODONE-acetaminophen (PERCOCET) 5-325 MG per tablet       Glo White

## 2021-01-26 NOTE — PLAN OF CARE
[address], [address], [address] [facility] [place]  Phone: [phone], [phone]                                                    Physical Therapy Certification    Dear Referring Practitioner: Kamille Griggs,    We had the pleasure of evaluating the following patient for physical therapy services at [address] [place] [address]. A summary of our findings can be found in the initial assessment below. This includes our plan of care. If you have any questions or concerns regarding these findings, please do not hesitate to contact me at the office phone number checked above.   Thank you for the referral.       Physician Signature:_______________________________Date:__________________  By signing above (or electronic signature), therapists plan is approved by physician      Patient: Kaylah Islas   : 1975   MRN: [id number]  Referring Physician: Referring Practitioner: Kamille Griggs      Evaluation Date: 2021      Medical Diagnosis Information:  Diagnosis: Left Rotator Cuff Tear s/p Repair with Rotational Graft 2021   Treatment Diagnosis: M75.102                                         Insurance information: PT Insurance Information: [employer]    Precautions/ Contra-indications/Relevant Medical History: Rotation Graft RTC repair with SAD    C-SSRS Triggered by Intake questionnaire (Past 2 wk assessment):   [x] No, Questionnaire did not trigger screening.   [] Yes, Patient intake triggered further evaluation      [] C-SSRS Screening completed  [] PCP notified via Plan of Care  [] Emergency services notified     Latex Allergy:  [x]NO      []YES      Preferred Language for Healthcare:   [x]English       []other:    SUBJECTIVE: Patient stated complaint:  Patient is a 55 y/o male who presents s/p left shoulder rotator cuff repair with rotation medical graft only, manipulation under anesthesia, lysis of adhesions and subacromial decompression. Initially injured self since July when reaching across his body to get something out of the middle console of his car. States his insurance denied his MRI and when he switched insurances in Nov. was able to get MRI. States having had significant pain since injury and still having increased pain since surgery. Functional Disability Index: Quick Dash/Modified Oswestry: 93% (Total Number Sum: 52/55)    Pain Scale: 8/10  Easing factors: changing positions  Provocative factors: wrong movement or positioning     Type: [x]Constant   []Intermittent  []Radiating []Localized []other:     Numbness/Tingling:  Patient denies any numbness or tingling    Functional Limitations/Impairments: [x]Lifting/reaching [x]Grooming [x]Carrying    [x]ADL's [x]Driving [x]Sports/Recreations   []Other:    Occupation/School: unemployed    Living Status/Prior Level of Function: Independent with ADLs and IADLs     OBJECTIVE:     CERV ROM     Cervical Flexion WNL    Cervical Extension WNL    Cervical SB WNL    Cervical rotation WNL         ROM Left Right   Shoulder Flex 80 °     Shoulder Abd     Shoulder ER 15 ° at 45 ° Abd     Shoulder IR     Elbow Flex     Elbow Ext     Wrist Flex     Wrist Ext     Strength  Left Right   Shoulder Flex N.T. Shoulder Scap \"    Shoulder ER \"    Shoulder IR \"    Elbow Flex \"    Elbow Ext \"    Wrist Flex     Wrist Ext     Boris        Reflexes/Sensation (myotomes/dermatomes):    []Normal    [x]Abnormal:      Joint mobility:    []Normal    [x]Hypo   []Hyper    Palpation: significant tenderness to light touch; Warmth noted     Functional Mobility/Transfers: decreased arm swing     Posture: forward guarded shoulder and elevated into a protective positioning    Bandages/Dressings/Incisions: surgical incision    Gait (include devices/WB status): decreased arm swing     Orthopedic Special Tests: n/a    [x] Patient history, allergies, meds reviewed. Medical chart reviewed.  See intake form.     Review Of Systems (ROS):  [x]Performed Review of systems (Integumentary, CardioPulmonary, Neurological) by intake and observation. Intake form has been scanned into medical record. Patient has been instructed to contact their primary care physician regarding ROS issues if not already being addressed at this time. Co-morbidities/Complexities (which will affect course of rehabilitation):   []None           Arthritic conditions   []Rheumatoid arthritis (M05.9)  []Osteoarthritis (M19.91)   Cardiovascular conditions   []Hypertension (I10)  []Hyperlipidemia (E78.5)  []Angina pectoris (I20)  []Atherosclerosis (I70)   Musculoskeletal conditions   []Disc pathology   []Congenital spine pathologies   []Prior surgical intervention  []Osteoporosis (M81.8)  []Osteopenia (M85.8)   Endocrine conditions   []Hypothyroid (E03.9)  []Hyperthyroid Gastrointestinal conditions   []Constipation (N50.70)   Metabolic conditions   []Morbid obesity (E66.01)  []Diabetes type 1(E10.65) or 2 (E11.65)   []Neuropathy (G60.9)     Pulmonary conditions   []Asthma (J45)  []Coughing   []COPD (J44.9)   Psychological Disorders  []Anxiety (F41.9)  []Depression (F32.9)   []Other:   [x]Other: Diverticulitis          Barriers to/and or personal factors that will affect rehab potential:              []Age  []Sex              []Motivation/Lack of Motivation                        []Co-Morbidities              []Cognitive Function, education/learning barriers              []Environmental, home barriers              []profession/work barriers  []past PT/medical experience  []other:  Justification:     Falls Risk Assessment (30 days):   [x] Falls Risk assessed and no intervention required.   [] Falls Risk assessed and Patient requires intervention due to being higher risk   TUG score (>12s at risk):     [] Falls education provided, including         ASSESSMENT:   Functional Impairments   [x]Noted spinal or UE joint hypomobility   []Noted spinal or UE joint hypermobility   [x]Decreased UE functional ROM   [x]Decreased UE functional strength   [x]Abnormal reflexes/sensation/myotomal/dermatomal deficits   [x]Decreased RC/scapular/core strength and neuromuscular control   []other:      Functional Activity Limitations (from functional questionnaire and intake)   []Reduced ability to tolerate prolonged functional positions   []Reduced ability or difficulty with changes of positions or transfers between positions   [x]Reduced ability to maintain good posture and demonstrate good body mechanics with sitting, bending, and lifting   [x] Reduced ability or tolerance with driving and/or computer work   [x]Reduced ability to sleep   [x]Reduced ability to perform lifting, reaching, carrying tasks   [x]Reduced ability to tolerate impact through UE   [x]Reduced ability to reach behind back   [x]Reduced ability to  or hold objects   [x]Reduced ability to throw or toss an object   []other:    Participation Restrictions   [x]Reduced participation in self care activities   [x]Reduced participation in home management activities   [x]Reduced participation in work activities   [x]Reduced participation in social activities. [x]Reduced participation in sport/recreation activities. Classification:   [x]Signs/symptoms consistent with post-surgical status including decreased ROM, strength and function.   []Signs/symptoms consistent with joint sprain/strain   []Signs/symptoms consistent with shoulder impingement   []Signs/symptoms consistent with shoulder/elbow/wrist tendinopathy   []Signs/symptoms consistent with Rotator cuff tear   []Signs/symptoms consistent with labral tear   []Signs/symptoms consistent with postural dysfunction    []Signs/symptoms consistent with Glenohumeral IR Deficit - <45 degrees   []Signs/symptoms consistent with facet dysfunction of cervical/thoracic spine    []Signs/symptoms consistent with pathology which may benefit from Dry needling     []other: Tolerance of evaluation/treatment:    []Excellent   [x]Good    []Fair   []Poor    Physical Therapy Evaluation Complexity Justification   [x] A history of present problem with:  [] no personal factors and/or comorbidities that impact the plan of care;  []1-2 personal factors and/or comorbidities that impact the plan of care  [x]3 personal factors and/or comorbidities that impact the plan of care  [x] An examination of body systems using standardized tests and measures addressing any of the following: body structures and functions (impairments), activity limitations, and/or participation restrictions;:  [] a total of 1-2 or more elements   [] a total of 3 or more elements   [x] a total of 4 or more elements   [x] A clinical presentation with:  [x] stable and/or uncomplicated characteristics   [] evolving clinical presentation with changing characteristics  [] unstable and unpredictable characteristics;   [x] Clinical decision making of [x] low, [] moderate, [] high complexity using standardized patient assessment instrument and/or measurable assessment of functional outcome. [x] EVAL (LOW) 68923 (typically 20 minutes face-to-face)  [] EVAL (MOD) 51775 (typically 30 minutes face-to-face)  [] EVAL (HIGH) 81767 (typically 45 minutes face-to-face)  [] RE-EVAL     PLAN:  Frequency/Duration:  1-2 days per week for 12 weeks:  INTERVENTIONS:  [x]  Therapeutic exercise including: strength training, ROM, for upper extremity and core   [x]  NMR activation and proprioception for UE and Core   [x]  Manual therapy as indicated for UE and spine to include: Dry Needling/IASTM, STM, PROM, Gr I-IV mobilizations, manipulation. [x] Modalities as needed that may include: thermal agents, E-stim, Biofeedback, US, iontophoresis as indicated  [x] Patient education on joint protection, postural re-education, activity modification, progression of HEP. HEP instruction: (see scanned forms)    GOALS:    Short Term Goals:  To be achieved in: 2 weeks  1. Independent in HEP and progression per patient tolerance, in order to prevent re-injury. [] Progressing: [] Met: [] Not Met: [] Adjusted   2. Patient will have a decrease in pain to facilitate improvement in movement, function, and ADLs as indicated by Functional Deficits. [] Progressing: [] Met: [] Not Met: [] Adjusted     Long Term Goals: To be achieved in: 12 weeks  1. Disability index score of 20% or less for the QuickDash/Oswestry to assist with reaching prior level of function. [] Progressing: [] Met: [] Not Met: [] Adjusted   2. Patient will demonstrate increased AROM to equal the opposite side bilaterally to allow for proper joint functioning as indicated by patients Functional Deficits. [] Progressing: [] Met: [] Not Met: [] Adjusted   3. Patient will demonstrate an increase in strength of L UE = R UE to allow for proper functional mobility as indicated by patients Functional Deficits. [] Progressing: [] Met: [] Not Met: [] Adjusted   4. Patient will return to all transfers, work activities, and functional activities without increased symptoms or restriction. [] Progressing: [] Met: [] Not Met: [] Adjusted   5. Patient will have 0/10 pain with ADL's.  [] Progressing: [] Met: [] Not Met: [] Adjusted   6.  Patient stated goal:  To be able to use L UE at [facility]   [] Progressing: [] Met: [] Not Met: [] Adjusted      Electronically signed by:  Louis Lee PT

## 2021-01-26 NOTE — FLOWSHEET NOTE
[facility], [address] [place] [address] [address], [address]  Phone: [phone], [phone]    Physical Therapy Treatment Note/ Progress Report:     Date:  2021    Patient Name:  Donovan Kinsey    :  1975  MRN: [id number]  Restrictions/Precautions:    Medical/Treatment Diagnosis Information:  · Diagnosis: Left Rotator Cuff Tear s/p Repair with Rotational Graft 2021  · Treatment Diagnosis: C63.242  Insurance/Certification information:  PT Insurance Information: [facility]  Physician Information:  Referring Practitioner: Gómez Mustafa  Has the plan of care been signed (Y/N):        []  Yes  [x]  No     Date of Patient follow up with Physician:     Is this a Progress Report:     []  Yes  [x]  No      If Yes:  Date Range for reporting period:  Initial Eval: 2021  Beginnin2021 --- Endin2021    Progress report will be due (10 Rx or 30 days whichever is less): 3/27/0175     Recertification will be due (POC Duration  / 90 days whichever is less): 2021      Visit # Insurance Allowable Auth Required   In Person 1 30 []  Yes     []  No    Tele Health -  []  Yes     []  No    Total 1         Functional Scale: Quick Dash: 93% (Total Number Sum: 52/55 Date assessed: 2021     Latex Allergy:  [x]NO      []YES  Preferred Language for Healthcare:   [x]English       []other:    Pain level:  8/10     SUBJECTIVE:  See eval    OBJECTIVE: See eval   Observation:    Test measurements:      RESTRICTIONS/PRECAUTIONS:  Rotation Graft RTC repair with SAD and manipluation   Exercises/Interventions:   Therapeutic Ex (47923)  Therapeutic Activity (59453)  NMR re-education (13017) Sets/Reps Notes/CUES   Pulley          Scap Squeeze 20 x     Shoulder Shrug 20 x     AAROM Elbow Flexion/Extension 20 x     Hand Gripper 20 x 5\" Red   Wrist Flex/Ext 20 x ea    Wrist Rad/Uln 20 x ea                                                                                    Manual Intervention (58205)     PROM 15'                                                 Patient Education 10' Pt ed with HEP       Therapeutic Exercise and NMR EXR  [x] (90979) Provided verbal/tactile cueing for activities related to strengthening, flexibility, endurance, ROM  for improvements in scapular, scapulothoracic and UE control with self care, reaching, carrying, lifting, house/yardwork, driving/computer work. [x] (38941) Provided verbal/tactile cueing for activities related to improving balance, coordination, kinesthetic sense, posture, motor skill, proprioception  to assist with  scapular, scapulothoracic and UE control with self care, reaching, carrying, lifting, house/yardwork, driving/computer work. Therapeutic Activities:    [x] (11078 or 36625) Provided verbal/tactile cueing for activities related to improving balance, coordination, kinesthetic sense, posture, motor skill, proprioception and motor activation to allow for proper function of scapular, scapulothoracic and UE control with self care, carrying, lifting, driving/computer work.      Home Exercise Program:    [x] (16706) Reviewed/Progressed HEP activities related to strengthening, flexibility, endurance, ROM of scapular, scapulothoracic and UE control with self care, reaching, carrying, lifting, house/yardwork, driving/computer work  [x] (44033) Reviewed/Progressed HEP activities related to improving balance, coordination, kinesthetic sense, posture, motor skill, proprioception of scapular, scapulothoracic and UE control with self care, reaching, carrying, lifting, house/yardwork, driving/computer work      Manual Treatments:  PROM / STM / Oscillations-Mobs:  G-I, II, III, IV (PA's, Inf., Post.)  [x] (30509) Provided manual therapy to mobilize soft tissue/joints of cervical/CT, scapular GHJ and UE for the purpose of modulating pain, promoting relaxation,  increasing ROM, reducing/eliminating soft tissue swelling/inflammation/restriction, improving soft tissue extensibility and allowing for proper ROM for normal function with self care, reaching, carrying, lifting, house/yardwork, driving/computer work    Modalities:  Vaso x 10 mins   Charges:  Timed Code Treatment Minutes: 30'   Total Treatment Minutes:  60'   BWC:  TE TIME:  NMR TIME:  MANUAL TIME:  UNTIMED MINUTES:  Medicare Total:   -  -  -  -  -      [] EVAL (LOW) 95132 (typically 20 minutes face-to-face)  [] EVAL (MOD) 56894 (typically 30 minutes face-to-face)  [] EVAL (HIGH) 55940 (typically 45 minutes face-to-face)  [] RE-EVAL     [x] BV(16074) x  1   [] IONTO  [] NMR (57382) x     [x] VASO  X 1  [x] Manual (82833) x  1   [] Other:  [] TA x      [] Mech Traction (76451)  [] ES(attended) (51989)     [] ES (un) (60711):    ASSESSMENT:  See eval    GOALS:     Short Term Goals: To be achieved in: 2 weeks  1. Independent in HEP and progression per patient tolerance, in order to prevent re-injury. [] Progressing: [] Met: [] Not Met: [] Adjusted   2. Patient will have a decrease in pain to facilitate improvement in movement, function, and ADLs as indicated by Functional Deficits. [] Progressing: [] Met: [] Not Met: [] Adjusted     Long Term Goals: To be achieved in: 12 weeks  1. Disability index score of 20% or less for the QuickDash/Oswestry to assist with reaching prior level of function. [] Progressing: [] Met: [] Not Met: [] Adjusted   2. Patient will demonstrate increased AROM to equal the opposite side bilaterally to allow for proper joint functioning as indicated by patients Functional Deficits. [] Progressing: [] Met: [] Not Met: [] Adjusted   3. Patient will demonstrate an increase in strength of L UE = R UE to allow for proper functional mobility as indicated by patients Functional Deficits. [] Progressing: [] Met: [] Not Met: [] Adjusted   4. Patient will return to all transfers, work activities, and functional activities without increased symptoms or restriction.    [] Progressing: [] Met: [] Not Met: [] Adjusted   5. Patient will have 0/10 pain with ADL's.  [] Progressing: [] Met: [] Not Met: [] Adjusted   6. Patient stated goal:  To be able to use L UE at PLOF   [] Progressing: [] Met: [] Not Met: [] Adjusted      Overall Progression Towards Functional goals/ Treatment Progress Update:  [] Patient is progressing as expected towards functional goals listed. [] Progression is slowed due to complexities/Impairments listed. [] Progression has been slowed due to co-morbidities. [x] Plan just implemented, too soon to assess goals progression <30days   [] Goals require adjustment due to lack of progress  [] Patient is not progressing as expected and requires additional follow up with physician  [] Other    Prognosis for POC: [x] Good [] Fair  [] Poor    Patient requires continued skilled intervention: [x] Yes  [] No    Treatment/Activity Tolerance:  [x] Patient able to complete treatment  [] Patient limited by fatigue  [] Patient limited by pain    [] Patient limited by other medical complications  [] Other:     Return to Play: (if applicable)   []  Stage 1: Intro to Strength   []  Stage 2: Return to Run and Strength   []  Stage 3: Return to Jump and Strength   []  Stage 4: Dynamic Strength and Agility   []  Stage 5: Sport Specific Training     []  Ready to Return to Play, Meets All Above Stages   []  Not Ready for Return to Sports   Comments:                         PLAN: See eval  [] Continue per plan of care [] Alter current plan (see comments above)  [x] Plan of care initiated [] Hold pending MD visit [] Discharge    Electronically signed by:  Kiarra Ring PT    Note: If patient does not return for scheduled/ recommended follow up visits, this note will serve as a discharge from care along with most recent update on progress.

## 2021-01-28 ENCOUNTER — HOSPITAL ENCOUNTER (OUTPATIENT)
Dept: PHYSICAL THERAPY | Age: 46
Setting detail: THERAPIES SERIES
Discharge: HOME | End: 2021-01-28
Payer: COMMERCIAL

## 2021-01-28 NOTE — FLOWSHEET NOTE
[address], [facility] ([facility])    Physical Therapy  Cancellation/No-show Note  Patient Name:  Edenilson Jiménez  :  1975   Date:  2021    Cancelled visits to date: 1  No-shows to date: 0    For today's appointment patient:  [x]  Cancelled  []  Rescheduled appointment  []  No-show     Reason given by patient:  []  Patient ill  []  Conflicting appointment  [x]  No transportation    []  Conflict with work  []  No reason given  []  Other:     Comments:      Phone call information:   [x]  Phone call made today to patient. [x]  Patient answered, conversation as follows: Girlfriend was called into work and he did not feel comfortable driving with his shoulder.    []  Patient did not answer. And no voicemail is set up to leave a message  []  Phone call not made today  []  Phone call not needed - pt contacted us to cancel and provided reason for cancellation.      Electronically signed by:  Piyush Fletcher, PTA

## 2021-02-01 ENCOUNTER — HOSPITAL ENCOUNTER (OUTPATIENT)
Dept: PHYSICAL THERAPY | Age: 46
Setting detail: THERAPIES SERIES
Discharge: HOME | End: 2021-02-01
Payer: COMMERCIAL

## 2021-02-01 PROCEDURE — 97110 THERAPEUTIC EXERCISES: CPT | Performed by: PHYSICAL THERAPY ASSISTANT

## 2021-02-01 PROCEDURE — 97016 VASOPNEUMATIC DEVICE THERAPY: CPT | Performed by: PHYSICAL THERAPY ASSISTANT

## 2021-02-01 PROCEDURE — 97140 MANUAL THERAPY 1/> REGIONS: CPT | Performed by: PHYSICAL THERAPY ASSISTANT

## 2021-02-01 NOTE — FLOWSHEET NOTE
[facility], [address] [place], [address]  Phone: [phone], [phone]    Physical Therapy Treatment Note/ Progress Report:     Date:  2021    Patient Name:  Fabrice Zaragoza    :  1975  MRN: [id number]  Restrictions/Precautions:    Medical/Treatment Diagnosis Information:  · Diagnosis: Left Rotator Cuff Tear s/p Repair with Rotational Graft 2021  · Treatment Diagnosis: D19.076  Insurance/Certification information:  PT Insurance Information: [facility]  Physician Information:  Referring Practitioner: Cordell Mares  Has the plan of care been signed (Y/N):        []  Yes  [x]  No     Date of Patient follow up with Physician:     Is this a Progress Report:     []  Yes  [x]  No      If Yes:  Date Range for reporting period:  Initial Eval: 2021  Beginnin2021 --- Endin2021    Progress report will be due (10 Rx or 30 days whichever is less): 3/23/2181     Recertification will be due (POC Duration  / 90 days whichever is less): 2021      Visit # Insurance Allowable Auth Required   In Person 2 30 []  Yes     []  No    Tele Health -  []  Yes     []  No    Total 2         Functional Scale: Quick Dash: 93% (Total Number Sum: 52/55 Date assessed: 2021     Latex Allergy:  [x]NO      []YES  Preferred Language for Healthcare:   [x]English       []other:    Pain level:  8/10     SUBJECTIVE:  Has been working on HEP. States they are painful. Has a constant nagging ache.       OBJECTIVE: See eval   Observation:    Test measurements:      RESTRICTIONS/PRECAUTIONS:  Rotation Graft RTC repair with SAD and manipluation   Exercises/Interventions:   Therapeutic Ex (84502)  Therapeutic Activity (11351)  NMR re-education (24851) Sets/Reps Notes/CUES   Pulley 5'         Scap Squeeze 30 x     Shoulder Shrug 30 x     AAROM Elbow Flexion/Extension X10ea 3 dir    Hand Gripper Red   Wrist Flex/Ext    Wrist Rad/Uln         Table slides  Flexion, scaption, ER 10x10\" ea                                                                           Manual Intervention (13371)     PROM 15'                        Added Table slides to Thierry Ball 112 2/1/2021                          Patient Education 10' Pt ed with HEP       Therapeutic Exercise and NMR EXR  [x] (81755) Provided verbal/tactile cueing for activities related to strengthening, flexibility, endurance, ROM  for improvements in scapular, scapulothoracic and UE control with self care, reaching, carrying, lifting, house/yardwork, driving/computer work. [x] (08908) Provided verbal/tactile cueing for activities related to improving balance, coordination, kinesthetic sense, posture, motor skill, proprioception  to assist with  scapular, scapulothoracic and UE control with self care, reaching, carrying, lifting, house/yardwork, driving/computer work. Therapeutic Activities:    [x] (38609 or 32630) Provided verbal/tactile cueing for activities related to improving balance, coordination, kinesthetic sense, posture, motor skill, proprioception and motor activation to allow for proper function of scapular, scapulothoracic and UE control with self care, carrying, lifting, driving/computer work.      Home Exercise Program:    [x] (81519) Reviewed/Progressed HEP activities related to strengthening, flexibility, endurance, ROM of scapular, scapulothoracic and UE control with self care, reaching, carrying, lifting, house/yardwork, driving/computer work  [x] (83737) Reviewed/Progressed HEP activities related to improving balance, coordination, kinesthetic sense, posture, motor skill, proprioception of scapular, scapulothoracic and UE control with self care, reaching, carrying, lifting, house/yardwork, driving/computer work      Manual Treatments:  PROM / STM / Oscillations-Mobs:  G-I, II, III, IV (PA's, Inf., Post.)  [x] (51600) Provided manual therapy to mobilize soft tissue/joints of cervical/CT, scapular GHJ and UE for the purpose of modulating pain, promoting relaxation,  increasing ROM, reducing/eliminating soft tissue swelling/inflammation/restriction, improving soft tissue extensibility and allowing for proper ROM for normal function with self care, reaching, carrying, lifting, house/yardwork, driving/computer work    Modalities:  Vaso x 10 mins   Charges:  Timed Code Treatment Minutes: 45   Total Treatment Minutes:  55'   BWC:  TE TIME:  NMR TIME:  MANUAL TIME:  UNTIMED MINUTES:  Medicare Total:   -  -  -  -  -      [] EVAL (LOW) 38490 (typically 20 minutes face-to-face)  [] EVAL (MOD) 27387 (typically 30 minutes face-to-face)  [] EVAL (HIGH) 23391 (typically 45 minutes face-to-face)  [] RE-EVAL     [x] TE(19988) x  2   [] IONTO  [] NMR (36448) x     [x] VASO  X 1  [x] Manual (05838) x  1   [] Other:  [] TA x      [] Mech Traction (92599)  [] ES(attended) (34239)     [] ES (un) (71260):    ASSESSMENT:  See eval    GOALS:     Short Term Goals: To be achieved in: 2 weeks  1. Independent in HEP and progression per patient tolerance, in order to prevent re-injury. [x] Progressing: [] Met: [] Not Met: [] Adjusted   2. Patient will have a decrease in pain to facilitate improvement in movement, function, and ADLs as indicated by Functional Deficits. [x] Progressing: [] Met: [] Not Met: [] Adjusted     Long Term Goals: To be achieved in: 12 weeks  1. Disability index score of 20% or less for the QuickDash/Oswestry to assist with reaching prior level of function. [x] Progressing: [] Met: [] Not Met: [] Adjusted   2. Patient will demonstrate increased AROM to equal the opposite side bilaterally to allow for proper joint functioning as indicated by patients Functional Deficits. [x] Progressing: [] Met: [] Not Met: [] Adjusted   3. Patient will demonstrate an increase in strength of L UE = R UE to allow for proper functional mobility as indicated by patients Functional Deficits. [x] Progressing: [] Met: [] Not Met: [] Adjusted   4. Patient will return to all transfers, work activities, and functional activities without increased symptoms or restriction. [x] Progressing: [] Met: [] Not Met: [] Adjusted   5. Patient will have 0/10 pain with ADL's. [x] Progressing: [] Met: [] Not Met: [] Adjusted   6. Patient stated goal:  To be able to use L UE at PLOF   [x] Progressing: [] Met: [] Not Met: [] Adjusted      Overall Progression Towards Functional goals/ Treatment Progress Update:  [x] Patient is progressing as expected towards functional goals listed. [] Progression is slowed due to complexities/Impairments listed. [] Progression has been slowed due to co-morbidities. [] Plan just implemented, too soon to assess goals progression <30days   [] Goals require adjustment due to lack of progress  [] Patient is not progressing as expected and requires additional follow up with physician  [] Other    Prognosis for POC: [x] Good [] Fair  [] Poor    Patient requires continued skilled intervention: [x] Yes  [] No    Treatment/Activity Tolerance:  [x] Patient able to complete treatment  [] Patient limited by fatigue  [] Patient limited by pain    [] Patient limited by other medical complications  [] Other:     Return to Play: (if applicable)   []  Stage 1: Intro to Strength   []  Stage 2: Return to Run and Strength   []  Stage 3: Return to Jump and Strength   []  Stage 4: Dynamic Strength and Agility   []  Stage 5: Sport Specific Training     []  Ready to Return to Play, Meets All Above Stages   []  Not Ready for Return to Sports   Comments:                         PLAN: See eval  [x] Continue per plan of care [] Alter current plan (see comments above)  [] Plan of care initiated [] Hold pending MD visit [] Discharge    Electronically signed by:  Yuriy Ornelas PTA    Note: If patient does not return for scheduled/ recommended follow up visits, this note will serve as a discharge from care along with most recent update on progress.

## 2021-02-04 ENCOUNTER — HOSPITAL ENCOUNTER (OUTPATIENT)
Dept: PHYSICAL THERAPY | Age: 46
Setting detail: THERAPIES SERIES
Discharge: HOME | End: 2021-02-04
Payer: COMMERCIAL

## 2021-02-04 PROCEDURE — 97140 MANUAL THERAPY 1/> REGIONS: CPT | Performed by: PHYSICAL THERAPY ASSISTANT

## 2021-02-04 PROCEDURE — G0283 ELEC STIM OTHER THAN WOUND: HCPCS | Performed by: PHYSICAL THERAPY ASSISTANT

## 2021-02-04 PROCEDURE — 97110 THERAPEUTIC EXERCISES: CPT | Performed by: PHYSICAL THERAPY ASSISTANT

## 2021-02-04 NOTE — FLOWSHEET NOTE
[facility], [address] [place] [address] [place], [address]  Phone: [phone], [phone]    Physical Therapy Treatment Note/ Progress Report:     Date:  2021    Patient Name:  Jennie Rossi    :  1975  MRN: [id number]  Restrictions/Precautions:    Medical/Treatment Diagnosis Information:  · Diagnosis: Left Rotator Cuff Tear s/p Repair with Rotational Graft 2021  · Treatment Diagnosis: Q15.346  Insurance/Certification information:  [facility] Information[facility]  Physician Information:  Referring Practitioner: Lieutenant Lopez  Has the plan of care been signed (Y/N):        []  Yes  [x]  No     Date of Patient follow up with Physician:     Is this a Progress Report:     []  Yes  [x]  No      If Yes:  Date Range for reporting period:  Initial Eval: 2021  Beginnin2021 --- Endin2021    Progress report will be due (10 Rx or 30 days whichever is less): 4/10/9572     Recertification will be due (POC Duration  / 90 days whichever is less): 2021      Visit # Insurance Allowable Auth Required   In Person 3 30 []  Yes     []  No    Tele Health -  []  Yes     []  No    Total 3         Functional Scale: Quick Dash: 93% (Total Number Sum: 52/55 Date assessed: 2021     Latex Allergy:  [x]NO      []YES  Preferred Language for Healthcare:   [x]English       []other:    Pain level:  8/10     SUBJECTIVE: States he has been very sore. He is out of his pain medications and states that he is struggling to get through his exercises. Taking Tylenol ES but reports that is not helping.       OBJECTIVE: See eval   Observation:    Test measurements:      RESTRICTIONS/PRECAUTIONS:  Rotation Graft RTC repair with SAD and manipluation   Exercises/Interventions:   Therapeutic Ex (08182)  Therapeutic Activity (05772)  NMR re-education (78471) Sets/Reps Notes/CUES   Pulley 5'         Scap Squeeze 30 x     Shoulder Shrug 30 x     AAROM Elbow Flexion/Extension X15ea 3 dir Hand Gripper Red   Wrist Flex/Ext    Wrist Rad/Uln         Table slides  Flexion, scaption, ER 10x10\" ea          Supine ER cane stretch 10x10\"    Supine cane press  x20    Supine cane flexion stretch  10x10\"                                                      Manual Intervention (38783)     PROM 15'                        Added Table slides to Thierry Ball 112 2/1/2021                          Patient Education 10' Pt ed with HEP       Therapeutic Exercise and NMR EXR  [x] (61414) Provided verbal/tactile cueing for activities related to strengthening, flexibility, endurance, ROM  for improvements in scapular, scapulothoracic and UE control with self care, reaching, carrying, lifting, house/yardwork, driving/computer work. [x] (51204) Provided verbal/tactile cueing for activities related to improving balance, coordination, kinesthetic sense, posture, motor skill, proprioception  to assist with  scapular, scapulothoracic and UE control with self care, reaching, carrying, lifting, house/yardwork, driving/computer work. Therapeutic Activities:    [x] (90180 or 32511) Provided verbal/tactile cueing for activities related to improving balance, coordination, kinesthetic sense, posture, motor skill, proprioception and motor activation to allow for proper function of scapular, scapulothoracic and UE control with self care, carrying, lifting, driving/computer work.      Home Exercise Program:    [x] (51647) Reviewed/Progressed HEP activities related to strengthening, flexibility, endurance, ROM of scapular, scapulothoracic and UE control with self care, reaching, carrying, lifting, house/yardwork, driving/computer work  [x] (31503) Reviewed/Progressed HEP activities related to improving balance, coordination, kinesthetic sense, posture, motor skill, proprioception of scapular, scapulothoracic and UE control with self care, reaching, carrying, lifting, house/yardwork, driving/computer work      Manual Treatments:  PROM / STM / Oscillations-Mobs:  G-I, II, III, IV (PA's, Inf., Post.)  [x] (02880) Provided manual therapy to mobilize soft tissue/joints of cervical/CT, scapular GHJ and UE for the purpose of modulating pain, promoting relaxation,  increasing ROM, reducing/eliminating soft tissue swelling/inflammation/restriction, improving soft tissue extensibility and allowing for proper ROM for normal function with self care, reaching, carrying, lifting, house/yardwork, driving/computer work    Modalities:  EGS/ice 15'   Charges:  Timed Code Treatment Minutes: 45   Total Treatment Minutes:  60'   BWC:  TE TIME:  NMR TIME:  MANUAL TIME:  UNTIMED MINUTES:  Medicare Total:   -  -  -  -  -      [] EVAL (LOW) 97714 (typically 20 minutes face-to-face)  [] EVAL (MOD) 07741 (typically 30 minutes face-to-face)  [] EVAL (HIGH) 95089 (typically 45 minutes face-to-face)  [] RE-EVAL     [x] QK(22645) x  2   [] IONTO  [] NMR (71734) x     [] VASO    [x] Manual (02604) x  1   [] Other:  [] TA x      [] Mech Traction (15588)  [] ES(attended) (79560)     [x] ES (un) (52078):    ASSESSMENT:  See eval    GOALS:     Short Term Goals: To be achieved in: 2 weeks  1. Independent in HEP and progression per patient tolerance, in order to prevent re-injury. [x] Progressing: [] Met: [] Not Met: [] Adjusted   2. Patient will have a decrease in pain to facilitate improvement in movement, function, and ADLs as indicated by Functional Deficits. [x] Progressing: [] Met: [] Not Met: [] Adjusted     Long Term Goals: To be achieved in: 12 weeks  1. Disability index score of 20% or less for the QuickDash/Oswestry to assist with reaching prior level of function. [x] Progressing: [] Met: [] Not Met: [] Adjusted   2. Patient will demonstrate increased AROM to equal the opposite side bilaterally to allow for proper joint functioning as indicated by patients Functional Deficits. [x] Progressing: [] Met: [] Not Met: [] Adjusted   3.  Patient will demonstrate an increase in strength of L UE = R UE to allow for proper functional mobility as indicated by patients Functional Deficits. [x] Progressing: [] Met: [] Not Met: [] Adjusted   4. Patient will return to all transfers, work activities, and functional activities without increased symptoms or restriction. [x] Progressing: [] Met: [] Not Met: [] Adjusted   5. Patient will have 0/10 pain with ADL's. [x] Progressing: [] Met: [] Not Met: [] Adjusted   6. Patient stated goal:  To be able to use L UE at PLOF   [x] Progressing: [] Met: [] Not Met: [] Adjusted      Overall Progression Towards Functional goals/ Treatment Progress Update:  [x] Patient is progressing as expected towards functional goals listed. [] Progression is slowed due to complexities/Impairments listed. [] Progression has been slowed due to co-morbidities.   [] Plan just implemented, too soon to assess goals progression <30days   [] Goals require adjustment due to lack of progress  [] Patient is not progressing as expected and requires additional follow up with physician  [] Other    Prognosis for POC: [x] Good [] Fair  [] Poor    Patient requires continued skilled intervention: [x] Yes  [] No    Treatment/Activity Tolerance:  [x] Patient able to complete treatment  [] Patient limited by fatigue  [] Patient limited by pain    [] Patient limited by other medical complications  [] Other:     Return to Play: (if applicable)   []  Stage 1: Intro to Strength   []  Stage 2: Return to Run and Strength   []  Stage 3: Return to Jump and Strength   []  Stage 4: Dynamic Strength and Agility   []  Stage 5: Sport Specific Training     []  Ready to Return to Play, Meets All Above Stages   []  Not Ready for Return to Sports   Comments:                         PLAN: See eval  [x] Continue per plan of care [] Alter current plan (see comments above)  [] Plan of care initiated [] Hold pending MD visit [] Discharge    Electronically signed by:  Harshad Mayen PTA    Note: If patient does not return for scheduled/ recommended follow up visits, this note will serve as a discharge from care along with most recent update on progress.

## 2021-02-08 ENCOUNTER — HOSPITAL ENCOUNTER (OUTPATIENT)
Dept: PHYSICAL THERAPY | Age: 46
Setting detail: THERAPIES SERIES
Discharge: HOME | End: 2021-02-08
Payer: COMMERCIAL

## 2021-02-08 NOTE — FLOWSHEET NOTE
[facility], [facility] ([facility])    Physical Therapy  Cancellation/No-show Note  Patient Name:  Lianet Toth  :  1975   Date:  2021    Cancelled visits to date: 2  No-shows to date: 0    For today's appointment patient:  [x]  Cancelled  []  Rescheduled appointment  []  No-show     Reason given by patient:  [x]  Patient ill  []  Conflicting appointment  []  No transportation    []  Conflict with work  []  No reason given  []  Other:     Comments:   running a slight fever    Phone call information:   [x]  Phone call made today to patient. [x]  Patient answered, conversation as follows:  running a slight fever    []  Patient did not answer. []  Phone call not made today  []  Phone call not needed - pt contacted us to cancel and provided reason for cancellation.      Electronically signed by:  Hunter [place] PT

## 2021-02-10 DIAGNOSIS — Z98.890 S/P ARTHROSCOPY OF LEFT SHOULDER: Primary | ICD-10-CM

## 2021-02-10 RX ORDER — OXYCODONE HYDROCHLORIDE AND ACETAMINOPHEN 5; 325 MG/1; MG/1
1 TABLET ORAL EVERY 6 HOURS PRN
Qty: 28 TABLET | Refills: 0
Start: 2021-02-10 | End: 2021-02-17

## 2021-02-11 ENCOUNTER — HOSPITAL ENCOUNTER (OUTPATIENT)
Dept: PHYSICAL THERAPY | Age: 46
Setting detail: THERAPIES SERIES
Discharge: HOME | End: 2021-02-11
Payer: COMMERCIAL

## 2021-02-15 ENCOUNTER — HOSPITAL ENCOUNTER (OUTPATIENT)
Dept: PHYSICAL THERAPY | Age: 46
Setting detail: THERAPIES SERIES
Discharge: HOME | End: 2021-02-15
Payer: COMMERCIAL

## 2021-02-15 NOTE — FLOWSHEET NOTE
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 []  Patient answered, conversation as follows:    []  Patient did not answer. []  Phone call not made today  []  Phone call not needed - pt contacted us to cancel and provided reason for cancellation.      Electronically signed by:  Ravinder Romano, PT

## 2021-02-17 DIAGNOSIS — Z98.890 S/P ARTHROSCOPY OF LEFT SHOULDER: Primary | ICD-10-CM

## 2021-02-17 RX ORDER — OXYCODONE HYDROCHLORIDE AND ACETAMINOPHEN 5; 325 MG/1; MG/1
1 TABLET ORAL EVERY 6 HOURS PRN
Qty: 28 TABLET | Refills: 0 | Status: SHIPPED | OUTPATIENT
Start: 2021-02-17 | End: 2021-02-24

## 2021-02-18 ENCOUNTER — HOSPITAL ENCOUNTER (OUTPATIENT)
Dept: PHYSICAL THERAPY | Age: 46
Setting detail: THERAPIES SERIES
Discharge: HOME | End: 2021-02-18
Payer: COMMERCIAL

## 2021-02-18 NOTE — FLOWSHEET NOTE
[place] [address], [facility])    Physical Therapy  Cancellation/No-show Note  Patient Name:  Mel Askew  :  1975   Date:  2021    Cancelled visits to date: 4  No-shows to date: 1    For today's appointment patient:  [x]  Cancelled  []  Rescheduled appointment  []  No-show     Reason given by patient:  []  Patient ill  []  Conflicting appointment  []  No transportation    []  Conflict with work  []  No reason given  [x]  Other:     Comments:  Patient has a flat tire on way to PT    Phone call information:   []  Phone call made today to patient. []  Patient answered, conversation as follows:    []  Patient did not answer. []  Phone call not made today  [x]  Phone call not needed - pt contacted us to cancel and provided reason for cancellation.      Electronically signed by:  Amy Ralph PTA

## 2021-07-16 NOTE — PROGRESS NOTES
Aware of consult for possible aortitis of infrarenal aorta - no documented bacteremia or procedure that would explain infection - inflammatory/vasculitic?    Will see tomorrow    Hussain Schultz MD   neurocirculatory lymphatic exam is otherwise normal symmetric in both upper extremities. Examination of the shoulder reveals: He has very painful range of motion with limitations of extension and internal rotation mostly. Any quick motions seem to hurt substantially. He has some crepitus in the shoulder. Most the pain is anterior and superior. He has no tenderness over the proximal biceps. He has a full active range of motion of the elbow, wrist and hand. Range of motion  (in degrees)   Right Left   ABDUCTION       EXT. ROTATION       INT. ROTATION       FORWARD FLEX    STRENGTH         Provocative Test Positive Negative Not indicated   Spurling Sign [] [x] []   Cross Arm Adduction Test [x] [] []   Apprehension Sign [] [] [x]   Neer Sign [x] [] []   Douglas Impingement Sign [x] [] []   Yergason test [] [] []   OGeovannys test [] [] []     Other Provocative tests:     IMAGING STUDIES    X-rays 3 views the left shoulder they brought with him are unremarkable for acute or chronic pathology    IMPRESSION    Left rotator cuff syndrome possible tear and possible labral tear    PLAN    1. Conservative care options including medicines and therapy were discussed. 2.  The indications for therapeutic injections were discussed. 3.  The indications for additional imaging studies were discussed. 4.  After considering the various options discussed, the patient elected to pursue a course that includes requesting an MRI left shoulder because of the severe nature of his pain and the exam despite him trying exercise that he was prescribed by his physician and medication that he was taken including steroids and narcotic pain medication. To avoid needing this long-term and to get him out of his sling, and recommend the MRI and follow-up with 1 of our shoulder surgeons after testing for disposition.

## 2021-08-12 ENCOUNTER — HOSPITAL ENCOUNTER (EMERGENCY)
Age: 46
Discharge: HOME | End: 2021-08-12
Attending: STUDENT IN AN ORGANIZED HEALTH CARE EDUCATION/TRAINING PROGRAM
Payer: COMMERCIAL

## 2021-08-12 ENCOUNTER — APPOINTMENT (OUTPATIENT)
Dept: GENERAL RADIOLOGY | Age: 46
End: 2021-08-12
Payer: COMMERCIAL

## 2021-08-12 VITALS
RESPIRATION RATE: 16 BRPM | TEMPERATURE: 97.9 F | HEART RATE: 94 BPM | BODY MASS INDEX: 30.81 KG/M2 | DIASTOLIC BLOOD PRESSURE: 107 MMHG | WEIGHT: 208 LBS | OXYGEN SATURATION: 98 % | SYSTOLIC BLOOD PRESSURE: 143 MMHG | HEIGHT: 69 IN

## 2021-08-12 DIAGNOSIS — S46.912A STRAIN OF LEFT SHOULDER, INITIAL ENCOUNTER: Primary | ICD-10-CM

## 2021-08-12 PROCEDURE — 6360000002 HC RX W HCPCS: Performed by: STUDENT IN AN ORGANIZED HEALTH CARE EDUCATION/TRAINING PROGRAM

## 2021-08-12 PROCEDURE — 73030 X-RAY EXAM OF SHOULDER: CPT

## 2021-08-12 PROCEDURE — 99284 EMERGENCY DEPT VISIT MOD MDM: CPT

## 2021-08-12 PROCEDURE — 96372 THER/PROPH/DIAG INJ SC/IM: CPT

## 2021-08-12 RX ORDER — KETOROLAC TROMETHAMINE 30 MG/ML
15 INJECTION, SOLUTION INTRAMUSCULAR; INTRAVENOUS ONCE
Status: COMPLETED | OUTPATIENT
Start: 2021-08-12 | End: 2021-08-12

## 2021-08-12 RX ADMIN — KETOROLAC TROMETHAMINE 15 MG: 30 INJECTION, SOLUTION INTRAMUSCULAR; INTRAVENOUS at 16:23

## 2021-08-12 ASSESSMENT — PAIN DESCRIPTION - ORIENTATION: ORIENTATION: LEFT

## 2021-08-12 ASSESSMENT — PAIN SCALES - GENERAL: PAINLEVEL_OUTOF10: 6

## 2021-08-12 ASSESSMENT — PAIN DESCRIPTION - PROGRESSION: CLINICAL_PROGRESSION: NOT CHANGED

## 2021-08-12 ASSESSMENT — PAIN DESCRIPTION - ONSET: ONSET: SUDDEN

## 2021-08-12 ASSESSMENT — PAIN DESCRIPTION - PAIN TYPE: TYPE: ACUTE PAIN

## 2021-08-12 ASSESSMENT — PAIN DESCRIPTION - DESCRIPTORS: DESCRIPTORS: BURNING

## 2021-08-12 ASSESSMENT — PAIN DESCRIPTION - LOCATION: LOCATION: SHOULDER

## 2021-08-12 ASSESSMENT — PAIN DESCRIPTION - FREQUENCY: FREQUENCY: CONTINUOUS

## 2021-08-12 NOTE — ED PROVIDER NOTES
[facility]      CHIEF COMPLAINT  Shoulder Pain (L shoulder pain after wrestling with grandchild. range of motion decreased do to pain)     HISTORY OF PRESENT ILLNESS  Rd Torres is a 54 y.o. male  who presents to the ED complaining of left shoulder pain. Patient states that he was playing with his grandchildren earlier today when he injured his left shoulder. He has a previous history of rotator cuff injury in that shoulder requiring surgical repair. He denies any chest pain or shortness of breath. He denies any other complaints. States the range of motion of the shoulder is decreased secondary to pain since the injury occurred. No other complaints, modifying factors or associated symptoms. I have reviewed the following from the nursing documentation. Past Medical History:   Diagnosis Date    Arthritis     left knee    COVID-19 12/30/2020    Diverticulitis     GERD (gastroesophageal reflux disease)     Kidney stones     Migraine      Past Surgical History:   Procedure Laterality Date    COLECTOMY  05/22/2018    Sigmoid    COLONOSCOPY  05/21/2018    diverticulosis    KNEE ARTHROSCOPY      SHOULDER ARTHROSCOPY Left 1/22/2021    LEFT SHOULDER VIDEO ARTHROSCOPY ROTATOR CUFF REPAIR,  AUGMENTATION WITH ROTATION MEDICAL GRAFT, SUBACROMIAL DECOMPRESSION ,LYSIS OF ADHESIONS performed by Sandra Whitehead DO at [address] [place] [address] Right      History reviewed. No pertinent family history.   Social History     Socioeconomic History    Marital status:      Spouse name: Not on file    Number of children: Not on file    Years of education: Not on file    Highest education level: Not on file   Occupational History    Not on file   Tobacco Use    Smoking status: Never Smoker    Smokeless tobacco: Never Used   Vaping Use    Vaping Use: Never used   Substance and Sexual Activity    Alcohol use: No    Drug use: No    Sexual activity: Yes     Partners: Female   Other Topics Concern    Not on file   Social History Narrative    Not on file     Social Determinants of Health     Financial Resource Strain:     Difficulty of Paying Living Expenses:    Food Insecurity:     Worried About Running Out of Food in the Last Year:     [address] in the Last Year:    Transportation Needs:     Lack of Transportation (Medical):  Lack of Transportation (Non-Medical):    Physical Activity:     Days of Exercise per Week:     Minutes of Exercise per Session:    Stress:     Feeling of Stress :    Social Connections:     Frequency of Communication with Friends and Family:     Frequency of Social Gatherings with Friends and Family:     Attends [employer] Services:     Active Member of Clubs or Organizations:     Attends Club or Organization Meetings:     Marital Status:    Intimate Partner Violence:     Fear of Current or Ex-Partner:     Emotionally Abused:     Physically Abused:     Sexually Abused:      No current facility-administered medications for this encounter. Current Outpatient Medications   Medication Sig Dispense Refill    methylPREDNISolone (MEDROL, HUMBERTO,) 4 MG tablet Take by mouth.  1 kit 0    diclofenac sodium (VOLTAREN) 1 % GEL Apply 2 g topically 4 times daily 2 Tube 3    magnesium oxide (MAG-OX) 400 MG tablet Take 400 mg by mouth daily      nortriptyline (PAMELOR) 10 MG capsule Take 10 mg by mouth nightly      rizatriptan (MAXALT) 10 MG tablet Take 10 mg by mouth once as needed for Migraine May repeat in 2 hours if needed      predniSONE (DELTASONE) 10 MG tablet Take 3 tabs bid x2days, then 2 tabs bid x2days, then 1 tab bid x2days, then 1 tab qd x2days 26 tablet 0    topiramate (TOPAMAX) 50 MG tablet TAKE 1 TABLET BY MOUTH TWICE DAILY  2    butalbital-acetaminophen-caffeine (FIORICET, ESGIC) -40 MG per tablet        Allergies   Allergen Reactions    Ultram [Tramadol Hcl] Itching       REVIEW OF

## 2021-08-16 ENCOUNTER — HOSPITAL ENCOUNTER (OUTPATIENT)
Dept: CT IMAGING | Age: 46
Discharge: HOME | End: 2021-08-16
Payer: COMMERCIAL

## 2021-08-16 ENCOUNTER — HOSPITAL ENCOUNTER (OUTPATIENT)
Dept: PULMONOLOGY | Age: 46
Discharge: HOME | End: 2021-08-16
Payer: COMMERCIAL

## 2021-08-16 VITALS — OXYGEN SATURATION: 97 %

## 2021-08-16 DIAGNOSIS — R06.02 SHORTNESS OF BREATH: ICD-10-CM

## 2021-08-16 LAB
EXPIRATORY TIME-POST: NORMAL
EXPIRATORY TIME: NORMAL
FEF 25-75% %CHNG: NORMAL
FEF 25-75% %PRED-POST: NORMAL
FEF 25-75% %PRED-PRE: NORMAL
FEF 25-75% PRED: NORMAL
FEF 25-75%-POST: NORMAL
FEF 25-75%-PRE: NORMAL
FEV1 %PRED-POST: 99 %
FEV1 %PRED-PRE: 96 %
FEV1 PRED: NORMAL
FEV1-POST: NORMAL
FEV1-PRE: NORMAL
FEV1/FVC %PRED-POST: NORMAL
FEV1/FVC %PRED-PRE: NORMAL
FEV1/FVC PRED: NORMAL
FEV1/FVC-POST: 86 %
FEV1/FVC-PRE: 83 %
FVC %PRED-POST: NORMAL
FVC %PRED-PRE: NORMAL
FVC PRED: NORMAL
FVC-POST: NORMAL
FVC-PRE: NORMAL
PEF %PRED-POST: NORMAL
PEF %PRED-PRE: NORMAL
PEF PRED: NORMAL
PEF%CHNG: NORMAL
PEF-POST: NORMAL
PEF-PRE: NORMAL

## 2021-08-16 PROCEDURE — 94060 EVALUATION OF WHEEZING: CPT

## 2021-08-16 PROCEDURE — 94760 N-INVAS EAR/PLS OXIMETRY 1: CPT

## 2021-08-16 PROCEDURE — 6370000000 HC RX 637 (ALT 250 FOR IP): Performed by: NURSE PRACTITIONER

## 2021-08-16 PROCEDURE — 71260 CT THORAX DX C+: CPT

## 2021-08-16 PROCEDURE — 6360000004 HC RX CONTRAST MEDICATION: Performed by: NURSE PRACTITIONER

## 2021-08-16 PROCEDURE — 94640 AIRWAY INHALATION TREATMENT: CPT

## 2021-08-16 RX ORDER — ALBUTEROL SULFATE 90 UG/1
4 AEROSOL, METERED RESPIRATORY (INHALATION) ONCE
Status: COMPLETED | OUTPATIENT
Start: 2021-08-16 | End: 2021-08-16

## 2021-08-16 RX ADMIN — IOPAMIDOL 75 ML: 755 INJECTION, SOLUTION INTRAVENOUS at 07:31

## 2021-08-16 RX ADMIN — Medication 4 PUFF: at 07:51

## 2021-08-16 ASSESSMENT — PULMONARY FUNCTION TESTS
FEV1_PERCENT_PREDICTED_PRE: 96
FEV1/FVC_PRE: 83
FEV1_PERCENT_PREDICTED_POST: 99
FEV1/FVC_POST: 86

## 2021-08-17 PROBLEM — G43.909 MIGRAINE WITHOUT STATUS MIGRAINOSUS, NOT INTRACTABLE: Status: ACTIVE | Noted: 2021-08-17

## 2021-08-17 PROBLEM — G89.29 OTHER CHRONIC PAIN: Status: ACTIVE | Noted: 2021-08-17

## 2021-08-17 PROBLEM — M75.112 INCOMPLETE ROTATOR CUFF TEAR OR RUPTURE OF LEFT SHOULDER, NOT SPECIFIED AS TRAUMATIC: Status: ACTIVE | Noted: 2021-08-17

## 2021-08-17 PROBLEM — R94.6 ABNORMAL THYROID FUNCTION TEST: Status: ACTIVE | Noted: 2021-08-17

## 2021-08-17 PROBLEM — J40 BRONCHITIS: Status: ACTIVE | Noted: 2021-08-17

## 2021-08-17 PROBLEM — R06.02 SHORTNESS OF BREATH: Status: ACTIVE | Noted: 2021-08-17

## 2021-08-17 PROBLEM — R45.89 DEPRESSED MOOD: Status: ACTIVE | Noted: 2021-08-17

## 2021-08-17 PROBLEM — R94.39 POSITIVE CARDIAC STRESS TEST: Status: ACTIVE | Noted: 2019-07-11

## 2021-08-17 PROBLEM — E78.1 HYPERTRIGLYCERIDEMIA: Status: ACTIVE | Noted: 2021-08-17

## 2021-08-17 PROBLEM — R79.89 SERUM CREATININE RAISED: Status: ACTIVE | Noted: 2019-07-11

## 2021-08-17 PROBLEM — Z71.3 DIETARY COUNSELING AND SURVEILLANCE: Status: ACTIVE | Noted: 2020-12-29

## 2021-08-17 PROBLEM — M25.512 PAIN IN LEFT SHOULDER: Status: ACTIVE | Noted: 2021-08-17

## 2021-08-17 PROBLEM — J30.9 ALLERGIC RHINITIS: Status: ACTIVE | Noted: 2021-08-17

## 2021-08-17 PROBLEM — R06.09 DOE (DYSPNEA ON EXERTION): Status: ACTIVE | Noted: 2019-07-11

## 2021-08-17 PROBLEM — Z13.1 SCREENING FOR DIABETES MELLITUS: Status: ACTIVE | Noted: 2021-08-17

## 2021-08-19 ENCOUNTER — OFFICE VISIT (OUTPATIENT)
Dept: ORTHOPEDIC SURGERY | Age: 46
End: 2021-08-19
Payer: COMMERCIAL

## 2021-08-19 VITALS — HEIGHT: 69 IN | BODY MASS INDEX: 30.81 KG/M2 | WEIGHT: 208 LBS

## 2021-08-19 DIAGNOSIS — M75.82 ROTATOR CUFF TENDONITIS, LEFT: Primary | ICD-10-CM

## 2021-08-19 DIAGNOSIS — M75.22 BICIPITAL TENDINITIS OF LEFT SHOULDER: ICD-10-CM

## 2021-08-19 DIAGNOSIS — M25.512 ACUTE PAIN OF LEFT SHOULDER: ICD-10-CM

## 2021-08-19 PROCEDURE — 20610 DRAIN/INJ JOINT/BURSA W/O US: CPT | Performed by: ORTHOPAEDIC SURGERY

## 2021-08-19 PROCEDURE — G8427 DOCREV CUR MEDS BY ELIG CLIN: HCPCS | Performed by: ORTHOPAEDIC SURGERY

## 2021-08-19 PROCEDURE — 99204 OFFICE O/P NEW MOD 45 MIN: CPT | Performed by: ORTHOPAEDIC SURGERY

## 2021-08-19 PROCEDURE — 1036F TOBACCO NON-USER: CPT | Performed by: ORTHOPAEDIC SURGERY

## 2021-08-19 PROCEDURE — G8417 CALC BMI ABV UP PARAM F/U: HCPCS | Performed by: ORTHOPAEDIC SURGERY

## 2021-08-19 RX ORDER — ROPIVACAINE HYDROCHLORIDE 5 MG/ML
20 INJECTION, SOLUTION EPIDURAL; INFILTRATION; PERINEURAL ONCE
Status: COMPLETED | OUTPATIENT
Start: 2021-08-19 | End: 2021-08-19

## 2021-08-19 RX ORDER — TRIAMCINOLONE ACETONIDE 40 MG/ML
40 INJECTION, SUSPENSION INTRA-ARTICULAR; INTRAMUSCULAR ONCE
Status: COMPLETED | OUTPATIENT
Start: 2021-08-19 | End: 2021-08-19

## 2021-08-19 RX ADMIN — ROPIVACAINE HYDROCHLORIDE 20 MG: 5 INJECTION, SOLUTION EPIDURAL; INFILTRATION; PERINEURAL at 09:41

## 2021-08-19 RX ADMIN — TRIAMCINOLONE ACETONIDE 40 MG: 40 INJECTION, SUSPENSION INTRA-ARTICULAR; INTRAMUSCULAR at 09:41

## 2021-08-19 NOTE — PROGRESS NOTES
ORTHOPAEDIC SURGERY H&P / CONSULTATION NOTE    Chief complaint:   Chief Complaint   Patient presents with    Shoulder Pain     left shoulder pain        History of present illness: The patient is a 54 y.o. male right hand dominant with subjective symptoms of left shoulder pain. The chief complaint is located at left shoulder. Duration of symptoms has been for 7 months. The severity of symptoms is rated at 5/10 pain on intake form. Patient states previous surgery left shoulder by Dr. Annalise Coats 1/22/2021. He went to 6 weeks worth of physical therapy and had seen her once postoperatively however ultimately insurance changes with her practice changes limited his ability to get back to see her. He states pain with overhead motion. He states lateral and anterior lateral based shoulder pain. He self-reports previous shoulder surgery given a prior injury where he was wrestling with his grandkids and felt a pop in the shoulder. He denies instability. Throbbing achy sharp pain present with decreased range of motion    The patient has tried the below listed items prior to today's consultation for above listed chief complaint.     +   Over-the-counter anti-inflammatories/prescription medication anti-inflammatory.      +   Physical therapy / guided home exercise program 6 weeks postop only     -   Previous corticosteroid injections    Past medical history:    Past Medical History:   Diagnosis Date    Arthritis     left knee    COVID-19 12/30/2020    Diverticulitis     GERD (gastroesophageal reflux disease)     Kidney stones     Migraine         Past surgical history:    Past Surgical History:   Procedure Laterality Date    COLECTOMY  05/22/2018    Sigmoid    COLONOSCOPY  05/21/2018    diverticulosis    KNEE ARTHROSCOPY      SHOULDER ARTHROSCOPY Left 1/22/2021    LEFT SHOULDER VIDEO ARTHROSCOPY ROTATOR CUFF REPAIR,  AUGMENTATION WITH ROTATION MEDICAL GRAFT, SUBACROMIAL DECOMPRESSION ,LYSIS OF ADHESIONS performed by Sue Leslie, DO at [address] EXTRACTION      WRIST SURGERY Right         Allergies: Allergies   Allergen Reactions    Ultram [Tramadol Hcl] Itching         Medications:   Current Outpatient Medications:     methylPREDNISolone (MEDROL, HUMBERTO,) 4 MG tablet, Take by mouth., Disp: 1 kit, Rfl: 0    diclofenac sodium (VOLTAREN) 1 % GEL, Apply 2 g topically 4 times daily, Disp: 2 Tube, Rfl: 3    magnesium oxide (MAG-OX) 400 MG tablet, Take 400 mg by mouth daily, Disp: , Rfl:     nortriptyline (PAMELOR) 10 MG capsule, Take 10 mg by mouth nightly, Disp: , Rfl:     rizatriptan (MAXALT) 10 MG tablet, Take 10 mg by mouth once as needed for Migraine May repeat in 2 hours if needed, Disp: , Rfl:     predniSONE (DELTASONE) 10 MG tablet, Take 3 tabs bid x2days, then 2 tabs bid x2days, then 1 tab bid x2days, then 1 tab qd x2days, Disp: 26 tablet, Rfl: 0    topiramate (TOPAMAX) 50 MG tablet, TAKE 1 TABLET BY MOUTH TWICE DAILY, Disp: , Rfl: 2    butalbital-acetaminophen-caffeine (FIORICET, ESGIC) -40 MG per tablet, , Disp: , Rfl:      Social history: Denies IV drug use.     Social History     Socioeconomic History    Marital status:      Spouse name: Not on file    Number of children: Not on file    Years of education: Not on file    Highest education level: Not on file   Occupational History    Not on file   Tobacco Use    Smoking status: Never Smoker    Smokeless tobacco: Never Used   Vaping Use    Vaping Use: Never used   Substance and Sexual Activity    Alcohol use: No    Drug use: No    Sexual activity: Yes     Partners: Female   Other Topics Concern    Not on file   Social History Narrative    Not on file     Social Determinants of Health     Financial Resource Strain:     Difficulty of Paying Living Expenses:    Food Insecurity:     Worried About Running Out of Food in the Last Year:     [address] in the Last Year:    Transportation Needs:     Lack of Transportation (Medical):  Lack of Transportation (Non-Medical):    Physical Activity:     Days of Exercise per Week:     Minutes of Exercise per Session:    Stress:     Feeling of Stress :    Social Connections:     Frequency of Communication with Friends and Family:     Frequency of Social Gatherings with Friends and Family:     Attends Baptism Services:     Active Member of Clubs or Organizations:     Attends Club or Organization Meetings:     Marital Status:    Intimate Partner Violence:     Fear of Current or Ex-Partner:     Emotionally Abused:     Physically Abused:     Sexually Abused: Tobacco use. Social History     Tobacco Use   Smoking Status Never Smoker   Smokeless Tobacco Never Used     Employment: Noncontributory    Workers compensation claim: Noncontributory    Review of systems: Patient denies any fevers chills chest pain shortness of breath nausea vomiting significant weight loss any change in voiding or bowel movements. Patient denies any significant numbness or tingling at baseline as it relates to this presenting symptom/chief complaint. The patient denies any significant problems with skin or any significant allergies. Physical examination:  Body mass index is 30.72 kg/m².   AAOx3, NCAT  EOMI  MMM  RR  Unlabored breathing, no wheezing  Skin intact BUE and BLE, warm and moist  Bilateral upper extremity examination specific to subjective symptoms    Exam Left Shoulder  Active Range of Motion (FF/Abd/ER/IR)        100/100/40/L1    limited secondary to pain         Passive Range of Motion (FF/Abd/ER/IR)     140/140  Positive   Neer,    positive Douglas,      5/5 albeit with discomfort   empty Can,          5/5 ER arm at the side,       5/5   belly Press,      5/5 bear Hug,       positive O'Briens,      negative Bettencourt Throwing,    positive   TTP at Biceps Tendon Sheath,     trace Speed, trace Yergeson, trace   TTP AC Joint, none cross arm adduction, Skin intact throughout  5/5 D B T G IO EPL  SILT Ax, R, U, M  +2 radial pulse    Diagnostic imaging:  MY READ:  Radiographs: 3 view left shoulder 12 August 2021 and 1 view left shoulder 19 August 2021: Negative fracture. Positive acromioclavicular joint space narrowing. Type I acromion    Outside hospital MRI left shoulder 12/16/2020: Positive bicipital tenosynovitis with degenerative superior labrum. Positive acromioclavicular joint synovitis/arthritis. No gross subscapularis tear. Positive high-grade tendinosis anterior supraspinatus with potential articular sided partial tear. No gross bursal sided tear infraspinatus. Trace subacromial edema    Pertinent lab work: None     Diagnosis Orders   1. Rotator cuff tendonitis, left  RI ARTHROCENTESIS ASPIR&/INJ MAJOR JT/BURSA W/O US    ropivacaine (NAROPIN) 0.5% injection 20 mg    triamcinolone acetonide (KENALOG-40) injection 40 mg    [facility] PT - [facility]   2. Bicipital tendinitis of left shoulder     3. Acute pain of left shoulder  XR SHOULDER LEFT 1 VW       Assessment and plan: 54 y.o. male with current subjective symptoms and physical exam findings with diagnostic imaging correlating to left shoulder rotator cuff tendinitis and bicipital tenosynovitis. -Time of 16 minutes minutes was spent coordinating and discussing the clinical findings, reviewing diagnostic imaging as indicated, coordinating care with prior notes review and current clinical encounter documentation as it pertains to the patient's presenting subjective symptoms and diagnoses. -I reviewed with the patient the imaging findings as well as clinical exam and  how it correlates to subjective symptoms.  -Pleasant discussion with the patient today.   I took extensive time today to review previous operative reports by Dr. Annalise Coats and also reviewed outside hospital MRI left shoulder dated 12/16/2020  -I reviewed with him that currently previous xenograft was used in the bursal space per the operative report. There could be some associated rotator cuff tendinitis in association with this. He also does have bicipital tenosynovitis. -At this time, I recommended a corticosteroid injection to help calm down the shoulder given its been 7 months since surgery and he still having pain which is waking him up and also causing discomfort with ADLs. Understanding the risks and benefits of this he wished to proceed. --The patient was educated to the risks (infection and skin blanching to name a few) and benefits of the injection and understanding these risks and benefits, the patient wished to proceed and a verbal consent was obtained. If the patient verbalized the presence of diabetes or rheumatologic condition on chronic immunosuppresseants as a comorbidity upon direct questioning, an additional discussion was had detailing the potential increased risk of infection and potential increase in FSBG and to monitor FSBG and adjust medications as needed.  -After sterile prep of the left shoulder, a 5 cc corticosteroid injection (4cc ropivicaine and 1cc kenolog 40) was administered into the subacromial space left shoulder. The patient tolerated the procedure well and started to have immediate improvement in pain/symptoms. Range of motion to 140 degrees forward flexion thereafter with less pain  -He is also been taking naproxen. I recommended a drug holiday from this and ultimately I prescribed Mobic 15 mg p.o. daily as needed pain for symptom relief of inflammation  -Formal physical therapy is also been prescribed to work on periscapular strengthening and stretching  -Pending the results of this, consideration for a biceps tendon sheath injection after 6 to 8 weeks worth of formal therapy.   Should he wish for this, and should he have anterior related shoulder pain continued, he will contact my office for an injection left shoulder biceps tendon sheath/groove  -All questions answered to the patient's satisfaction and the patient expressed understanding and agreement with the above listed treatment plan  -Follow up 8 weeks as needed  -Thank you for the clinical consultation and allowing me to participate in the patient's care. Electronically signed by Dontrell Mckeon MD on 8/19/21 at 9:50 AM BRADEN Mckeon MD       Orthopaedic Surgery-Sports Medicine        Disclaimer: This note was dictated with voice recognition software. Though review and correction are routinely performed, please contact the office/medical records for any errors requiring correction.

## 2021-08-20 DIAGNOSIS — M75.82 ROTATOR CUFF TENDONITIS, LEFT: Primary | ICD-10-CM

## 2021-08-20 NOTE — TELEPHONE ENCOUNTER
Prescription Refill     Medication Name:  [facility]    Patient seen yesterday with \"DR Duyen Ayala and his pharmacy doesn't have the prescription     Pharmacy  [address]    Patient Contact Number:

## 2021-08-20 NOTE — TELEPHONE ENCOUNTER
Medication that you documented in your visit note from yesterday has been pended. If you still want the patient to have this medication, please sign order, and I'll contact patient to let them know.

## 2021-08-21 RX ORDER — MELOXICAM 15 MG/1
15 TABLET ORAL DAILY
Qty: 30 TABLET | Refills: 0 | Status: SHIPPED | OUTPATIENT
Start: 2021-08-21

## 2021-08-21 NOTE — PROGRESS NOTES
Patient reached on call physician, as his Rx request for Mobic was not sent to Pharmacy from his recent 8/19/2021 visit with Dr Petra Zacarias. Was prescribed this for residual rotator cuff tendinitis diagnosed by Dr Petra Zacarias. No allergies or contra-indications to NSAIDs. Prior NSAIDs well tolerated by patient no history of GI upset, GUD. Mobic 15mg po daily x 30 days sent to patient's pharmacy. Advised to follow-up with Dr Petra Zacarias as previously scheduled.      Sincerely,    Yesica Barfield MD  8/21/2021  5:00 PM

## 2021-08-23 RX ORDER — MELOXICAM 15 MG/1
15 TABLET ORAL DAILY PRN
Qty: 30 TABLET | Refills: 0 | Status: SHIPPED | OUTPATIENT
Start: 2021-08-23

## 2021-08-23 NOTE — PROCEDURES
[address]                 [address] [address]                               PULMONARY FUNCTION    PATIENT NAME: Tony Santiago                  :        1975  MED REC NO:   [id number]                          ROOM:  ACCOUNT NO:   [de-identified]                           ADMIT DATE: 2021  PROVIDER:     Teodoro Myers MD    DATE OF PROCEDURE:  2021    INDICATION:  Shortness of breath. FINDINGS:  1. Spirometry: The FEV1 is 3.78 liters, which is 96% predicted. The  FEV1/FVC ratio is normal.  Inhaled bronchodilators are given. There is  no significant improvement. IMPRESSION:  1. Normal spirometry.         Sandra Noyola MD    D: 2021 20:02:57       T: 2021 2:38:14     [email or web address]  Job#: [id number]     Doc#: [id number]    CC:

## 2021-08-31 ENCOUNTER — HOSPITAL ENCOUNTER (OUTPATIENT)
Dept: PHYSICAL THERAPY | Age: 46
Setting detail: THERAPIES SERIES
Discharge: HOME | End: 2021-08-31
Payer: COMMERCIAL

## 2021-08-31 NOTE — FLOWSHEET NOTE
[facility], [address] [place] [address], [address] [place] [address] [address]  Phone: [phone]   Fax:     [phone]    Physical Therapy  Cancellation/No-show Note  Patient Name:  Zana Rutledge  :  1975   Date:  2021    Cancelled visits to date: 0  No-shows to date: 1    For today's appointment patient:  []  Cancelled  []  Rescheduled appointment  [x]  No-show     Reason given by patient:  []  Patient ill  []  Conflicting appointment  []  No transportation    []  Conflict with work  []  No reason given  []  Other:     Comments:      Phone call information:   []  Phone call made today to patient at _ time at number provided:      []  Patient answered, conversation as follows:    []  Patient did not answer, message left as follows:  []  Phone call not made today  []  Phone call not needed - pt contacted us to cancel and provided reason for cancellation.      Electronically signed by:  Chante Jarvis, PT, PT

## 2021-09-16 PROBLEM — Z13.1 SCREENING FOR DIABETES MELLITUS: Status: RESOLVED | Noted: 2021-08-17 | Resolved: 2021-09-16

## 2022-05-24 ENCOUNTER — TELEPHONE (OUTPATIENT)
Dept: ORTHOPEDIC SURGERY | Age: 47
End: 2022-05-24

## 2022-05-24 NOTE — TELEPHONE ENCOUNTER
Notified [address] [facility] records (all) for patient  at [place]. Please have patient sign the release form included.

## 2022-06-15 ENCOUNTER — TELEPHONE (OUTPATIENT)
Dept: ORTHOPEDIC SURGERY | Age: 47
End: 2022-06-15

## (undated) DEVICE — NEEDLE SPNL 18GA L3IN S STL REG WALL FIT STYL PNK HUB W/

## (undated) DEVICE — SHOULDER STABILIZATION KIT,                                    DISPOSABLE 12 PER BOX

## (undated) DEVICE — COVER BOOT THK2MIL UNIV POLYETH IMPERMEABLE FLD RESIST DISP

## (undated) DEVICE — 5.5 MM FULL RADIUS STRAIGHT                                    BLADES, POWER/EP-1, ORANGE, PACKAGED                                    6 PER BOX, STERILE

## (undated) DEVICE — MEDI-VAC NON-CONDUCTIVE SUCTION TUBING: Brand: CARDINAL HEALTH

## (undated) DEVICE — SMARTGOWN SURGICAL GOWN, XL: Brand: CONVERTORS

## (undated) DEVICE — WEREWOLF FLOW 90 COBLATION WAND: Brand: COBLATION

## (undated) DEVICE — 5.5 CM ACROMIOBLASTER STRAIGHT                                    BURRS, POWER/EP-1, BRICK RED, 8000                                    MAXIMUM RPM, PACKAGED 6 PER BOX, STERILE

## (undated) DEVICE — SUTURE ETHLN SZ 3-0 L18IN NONABSORBABLE BLK PS-2 L19MM 3/8 1669H

## (undated) DEVICE — SUTURE VCRL SZ 2-0 L27IN ABSRB UD L26MM SH 1/2 CIR J417H

## (undated) DEVICE — 1010 S-DRAPE TOWEL DRAPE 10/BX: Brand: STERI-DRAPE™

## (undated) DEVICE — MAT TRACK 40 X 72 IN ABSORBENT

## (undated) DEVICE — 3.5 MM INCISOR STRAIGHT BLADES,                                    POWER/EP-1, MEDIUM GRAY, PACKAGED 6                                    PER BOX, STERILE

## (undated) DEVICE — PAD FELT FOR ARTHROVAC FLR SUCT SYS

## (undated) DEVICE — GLOVE SURG SZ 7 L12IN FNGR THK94MIL STD WHT ISOLEX LTX FREE

## (undated) DEVICE — DRAPE 70X60IN SPLIT IMPERV ADHES STRIP

## (undated) DEVICE — Device

## (undated) DEVICE — TUBING PMP IRRIG GOFLO

## (undated) DEVICE — 3M™ MEDIPORE™ SOFT CLOTH TAPE, 4 INCH X 10 YARDS, 12 ROLLS/CASE, 2964: Brand: 3M™ MEDIPORE™

## (undated) DEVICE — STERILE LATEX POWDER-FREE SURGICAL GLOVESWITH NITRILE COATING: Brand: PROTEXIS